# Patient Record
Sex: MALE | Race: WHITE | NOT HISPANIC OR LATINO | Employment: FULL TIME | URBAN - METROPOLITAN AREA
[De-identification: names, ages, dates, MRNs, and addresses within clinical notes are randomized per-mention and may not be internally consistent; named-entity substitution may affect disease eponyms.]

---

## 2018-10-15 ENCOUNTER — HISTORICAL (OUTPATIENT)
Dept: LAB | Facility: HOSPITAL | Age: 33
End: 2018-10-15

## 2018-10-15 LAB
ABS NEUT (OLG): 1.77 X10(3)/MCL (ref 2.1–9.2)
ALBUMIN SERPL-MCNC: 4.6 GM/DL (ref 3.4–5)
ALBUMIN/GLOB SERPL: 1.6 RATIO (ref 1.1–2)
ALP SERPL-CCNC: 57 UNIT/L (ref 46–116)
ALT SERPL-CCNC: 25 UNIT/L (ref 12–78)
AST SERPL-CCNC: 15 UNIT/L (ref 15–37)
BILIRUB SERPL-MCNC: 1.1 MG/DL (ref 0.2–1)
BILIRUBIN DIRECT+TOT PNL SERPL-MCNC: 0.21 MG/DL (ref 0–0.2)
BILIRUBIN DIRECT+TOT PNL SERPL-MCNC: 0.89 MG/DL (ref 0–0.8)
BUN SERPL-MCNC: 28.1 MG/DL (ref 7–18)
CALCIUM SERPL-MCNC: 8.9 MG/DL (ref 8.5–10.1)
CHLORIDE SERPL-SCNC: 102 MMOL/L (ref 98–107)
CO2 SERPL-SCNC: 30.2 MMOL/L (ref 21–32)
CREAT SERPL-MCNC: 1.18 MG/DL (ref 0.6–1.3)
DEPRECATED CALCIDIOL+CALCIFEROL SERPL-MC: 32.22 NG/ML (ref 30–80)
EOSINOPHIL # BLD AUTO: 0 X10(3)/MCL (ref 0–0.9)
EOSINOPHIL NFR BLD AUTO: 1 %
ERYTHROCYTE [DISTWIDTH] IN BLOOD BY AUTOMATED COUNT: 12.3 % (ref 11.5–17)
EST. AVERAGE GLUCOSE BLD GHB EST-MCNC: 100 MG/DL
GLOBULIN SER-MCNC: 2.9 GM/DL (ref 2.4–3.5)
GLUCOSE SERPL-MCNC: 91 MG/DL (ref 74–106)
HBA1C MFR BLD: 5.1 % (ref 4.5–6.2)
HCT VFR BLD AUTO: 44.8 % (ref 42–52)
HGB BLD-MCNC: 15 GM/DL (ref 14–18)
LYMPHOCYTES # BLD AUTO: 1.5 X10(3)/MCL (ref 0.6–4.6)
LYMPHOCYTES NFR BLD AUTO: 43 %
MCH RBC QN AUTO: 28.3 PG (ref 27–31)
MCHC RBC AUTO-ENTMCNC: 33.5 GM/DL (ref 33–36)
MCV RBC AUTO: 84.5 FL (ref 80–94)
MONOCYTES # BLD AUTO: 0.2 X10(3)/MCL (ref 0.1–1.3)
MONOCYTES NFR BLD AUTO: 5 %
NEUTROPHILS # BLD AUTO: 1.77 X10(3)/MCL (ref 1.4–7.9)
NEUTROPHILS NFR BLD AUTO: 51 %
PLATELET # BLD AUTO: 191 X10(3)/MCL (ref 130–400)
PMV BLD AUTO: 9.5 FL (ref 9.4–12.4)
POTASSIUM SERPL-SCNC: 4.4 MMOL/L (ref 3.5–5.1)
PROT SERPL-MCNC: 7.5 GM/DL (ref 6.4–8.2)
RBC # BLD AUTO: 5.3 X10(6)/MCL (ref 4.7–6.1)
SODIUM SERPL-SCNC: 140 MMOL/L (ref 136–145)
TESTOST SERPL-MCNC: 543 NG/DL (ref 241–827)
TSH SERPL-ACNC: 1.74 MIU/ML (ref 0.36–3.74)
WBC # SPEC AUTO: 3.5 X10(3)/MCL (ref 4.5–11.5)

## 2019-10-22 ENCOUNTER — HISTORICAL (OUTPATIENT)
Dept: LAB | Facility: HOSPITAL | Age: 34
End: 2019-10-22

## 2019-10-22 LAB
ABS NEUT (OLG): 1.78 X10(3)/MCL (ref 2.1–9.2)
ALBUMIN SERPL-MCNC: 4.8 GM/DL (ref 3.4–5)
ALBUMIN/GLOB SERPL: 1.4 RATIO (ref 1.1–2)
ALP SERPL-CCNC: 67 UNIT/L (ref 50–136)
ALT SERPL-CCNC: 26 UNIT/L (ref 12–78)
APPEARANCE, UA: CLEAR
AST SERPL-CCNC: 16 UNIT/L (ref 15–37)
BACTERIA SPEC CULT: NORMAL /HPF
BASOPHILS # BLD AUTO: 0 X10(3)/MCL (ref 0–0.2)
BASOPHILS NFR BLD AUTO: 0 %
BILIRUB SERPL-MCNC: 0.7 MG/DL (ref 0.2–1)
BILIRUB UR QL STRIP: NEGATIVE
BILIRUBIN DIRECT+TOT PNL SERPL-MCNC: 0.1 MG/DL (ref 0–0.5)
BILIRUBIN DIRECT+TOT PNL SERPL-MCNC: 0.6 MG/DL (ref 0–0.8)
BUN SERPL-MCNC: 28 MG/DL (ref 7–18)
CALCIUM SERPL-MCNC: 9 MG/DL (ref 8.5–10.1)
CHLORIDE SERPL-SCNC: 105 MMOL/L (ref 98–107)
CHOLEST SERPL-MCNC: 286 MG/DL (ref 0–200)
CHOLEST/HDLC SERPL: 4.8 {RATIO} (ref 0–5)
CO2 SERPL-SCNC: 28 MMOL/L (ref 21–32)
COLOR UR: YELLOW
CREAT SERPL-MCNC: 1.34 MG/DL (ref 0.7–1.3)
EOSINOPHIL # BLD AUTO: 0 X10(3)/MCL (ref 0–0.9)
EOSINOPHIL NFR BLD AUTO: 1 %
ERYTHROCYTE [DISTWIDTH] IN BLOOD BY AUTOMATED COUNT: 12.2 % (ref 11.5–17)
EST. AVERAGE GLUCOSE BLD GHB EST-MCNC: 97 MG/DL
GLOBULIN SER-MCNC: 3.4 GM/DL (ref 2.4–3.5)
GLUCOSE (UA): NEGATIVE
GLUCOSE SERPL-MCNC: 91 MG/DL (ref 74–106)
HBA1C MFR BLD: 5 % (ref 4.2–6.3)
HCT VFR BLD AUTO: 47.9 % (ref 42–52)
HDLC SERPL-MCNC: 60 MG/DL (ref 35–60)
HGB BLD-MCNC: 15.8 GM/DL (ref 14–18)
HGB UR QL STRIP: NEGATIVE
KETONES UR QL STRIP: NEGATIVE
LDLC SERPL CALC-MCNC: 206 MG/DL (ref 0–129)
LEUKOCYTE ESTERASE UR QL STRIP: NEGATIVE
LYMPHOCYTES # BLD AUTO: 1.4 X10(3)/MCL (ref 0.6–4.6)
LYMPHOCYTES NFR BLD AUTO: 40 %
MCH RBC QN AUTO: 27.6 PG (ref 27–31)
MCHC RBC AUTO-ENTMCNC: 33 GM/DL (ref 33–36)
MCV RBC AUTO: 83.7 FL (ref 80–94)
MONOCYTES # BLD AUTO: 0.2 X10(3)/MCL (ref 0.1–1.3)
MONOCYTES NFR BLD AUTO: 6 %
NEUTROPHILS # BLD AUTO: 1.78 X10(3)/MCL (ref 2.1–9.2)
NEUTROPHILS NFR BLD AUTO: 52 %
NITRITE UR QL STRIP: NEGATIVE
PH UR STRIP: 7 [PH] (ref 5–9)
PLATELET # BLD AUTO: 204 X10(3)/MCL (ref 130–400)
PMV BLD AUTO: 10.6 FL (ref 9.4–12.4)
POTASSIUM SERPL-SCNC: 4.5 MMOL/L (ref 3.5–5.1)
PROT SERPL-MCNC: 8.2 GM/DL (ref 6.4–8.2)
PROT UR QL STRIP: NEGATIVE
RBC # BLD AUTO: 5.72 X10(6)/MCL (ref 4.7–6.1)
RBC #/AREA URNS HPF: NORMAL /[HPF]
SODIUM SERPL-SCNC: 139 MMOL/L (ref 136–145)
SP GR UR STRIP: 1.01 (ref 1–1.03)
SQUAMOUS EPITHELIAL, UA: NORMAL
TRIGL SERPL-MCNC: 102 MG/DL (ref 30–150)
TSH SERPL-ACNC: 1.87 MIU/L (ref 0.36–3.74)
UROBILINOGEN UR STRIP-ACNC: 0.2
VLDLC SERPL CALC-MCNC: 20 MG/DL
WBC # SPEC AUTO: 3.4 X10(3)/MCL (ref 4.5–11.5)
WBC #/AREA URNS HPF: NORMAL /HPF

## 2019-12-04 ENCOUNTER — HISTORICAL (OUTPATIENT)
Dept: RADIOLOGY | Facility: HOSPITAL | Age: 34
End: 2019-12-04

## 2022-04-10 ENCOUNTER — HISTORICAL (OUTPATIENT)
Dept: ADMINISTRATIVE | Facility: HOSPITAL | Age: 37
End: 2022-04-10

## 2022-04-26 VITALS
BODY MASS INDEX: 28.31 KG/M2 | SYSTOLIC BLOOD PRESSURE: 124 MMHG | DIASTOLIC BLOOD PRESSURE: 84 MMHG | OXYGEN SATURATION: 99 % | HEIGHT: 69 IN | WEIGHT: 191.13 LBS

## 2022-09-12 ENCOUNTER — TELEPHONE (OUTPATIENT)
Dept: ADMINISTRATIVE | Facility: HOSPITAL | Age: 37
End: 2022-09-12
Payer: OTHER GOVERNMENT

## 2022-09-12 NOTE — TELEPHONE ENCOUNTER
----- Message from Tommy Peck MA sent at 9/12/2022 12:50 PM CDT -----  Regarding: FW: Return Call    ----- Message -----  From: Suzanna Marie  Sent: 9/12/2022  12:40 PM CDT  To: Reji Chavarria Staff  Subject: Return Call                                      Type:  Patient Returning Call    Who Called: pt  Who Left Message for Patient: for nurse  Does the patient know what this is regarding?: yes  Would the patient rather a call back or a response via [facility]?  Call back  Best Call Back Number: [phone]  Additional Information: pt called to schedule a hospital f/u .. he wants something sooner due to having some type of nerve damage .. he had a  injury

## 2022-09-15 ENCOUNTER — OFFICE VISIT (OUTPATIENT)
Dept: INTERNAL MEDICINE | Facility: CLINIC | Age: 37
End: 2022-09-15
Payer: OTHER GOVERNMENT

## 2022-09-15 VITALS
OXYGEN SATURATION: 99 % | DIASTOLIC BLOOD PRESSURE: 80 MMHG | BODY MASS INDEX: 29.49 KG/M2 | TEMPERATURE: 98 F | WEIGHT: 206 LBS | SYSTOLIC BLOOD PRESSURE: 128 MMHG | RESPIRATION RATE: 16 BRPM | HEART RATE: 78 BPM | HEIGHT: 70 IN

## 2022-09-15 DIAGNOSIS — M54.9 DORSALGIA, UNSPECIFIED: Primary | ICD-10-CM

## 2022-09-15 DIAGNOSIS — M54.16 LUMBAR RADICULOPATHY: ICD-10-CM

## 2022-09-15 PROCEDURE — 99214 PR OFFICE/OUTPT VISIT, EST, LEVL IV, 30-39 MIN: ICD-10-PCS | Mod: ,,, | Performed by: INTERNAL MEDICINE

## 2022-09-15 PROCEDURE — 99214 OFFICE O/P EST MOD 30 MIN: CPT | Mod: ,,, | Performed by: INTERNAL MEDICINE

## 2022-09-15 RX ORDER — DICLOFENAC SODIUM 75 MG/1
75 TABLET, DELAYED RELEASE ORAL 2 TIMES DAILY
COMMUNITY
Start: 2022-09-10 | End: 2022-10-10

## 2022-09-15 RX ORDER — METHYLPREDNISOLONE 4 MG/1
1 TABLET ORAL
COMMUNITY
Start: 2022-09-10 | End: 2022-09-16

## 2022-09-15 NOTE — ASSESSMENT & PLAN NOTE
Patient with straight leg raise positive at 45° on the right.    MRI ordered.    Referral to PT ordered.

## 2022-09-15 NOTE — PROGRESS NOTES
Subjective:      Patient ID: Vasu Victor is a 37 y.o. male.    Chief Complaint: Follow-up (ED F/U for Back pain/injury)      HPI:  37 year old male who I have not seen since 2019 presents with lumbar back pain in the L4- L5 area with radicular symtpoms on the right  Was doing  drills and began having acute onset of numbess down the right leg. [place] [facility] [place] with no acute findings. Hard time driving.     Past Medical History:  History reviewed. No pertinent past medical history.  Past Surgical History:   Procedure Laterality Date    VASECTOMY  6/1/19     Review of patient's allergies indicates:  No Known Allergies  Current Outpatient Medications on File Prior to Visit   Medication Sig Dispense Refill    diclofenac (VOLTAREN) 75 MG EC tablet Take 75 mg by mouth 2 (two) times a day.      methylPREDNISolone (MEDROL DOSEPACK) 4 mg tablet Take 1 tablet by mouth.       No current facility-administered medications on file prior to visit.     Social History     Socioeconomic History    Marital status:    Tobacco Use    Smoking status: Never    Smokeless tobacco: Never   Substance and Sexual Activity    Alcohol use: Yes     Alcohol/week: 7.0 standard drinks     Types: 7 Glasses of wine per week    Drug use: Never    Sexual activity: Yes     Partners: Female     Birth control/protection: Partner-Vasectomy     Family History   Problem Relation Age of Onset    Diabetes Mother     Heart disease Father        Review of Systems  Constitutional: No fever, no chills, no night sweats, no changes in weight, no changes in appetite.  Eye: No blurring of vision, no double vision, no conjunctival injection, no acute vision loss  ENMT: No trauma, No sore throat, no rhinorrhea, no tinnitus, no headache, no vertigo, no ear pain or discharge  Respiratory: No cough, no sputum production, no shortness of breath, no hemoptysis, no wheezing.  Cardiovascular: No chest pain, no PALM, no PND, no orthopnea, no edema, no  "palpitations.  Gastrointestinal: No abdominal pain, nausea, no vomiting, no changes in frequency or consistency of stools, no diarrhea, no constipation, no BRBPR.  Genitourinary: No dysuria, no hematuria, no foul-smelling urine, no straining to urinate, no increase in frequency  Heme/Lymph: No easy bruising/ bleeding, no swollen or painful glands.  Endocrine: No polyuria, no polydipsia, no polyphagia, no heat or cold intolerance.  Musculoskeletal: No muscle pain, no muscle weakness, no joint pain, + difficulties on reference to range of motion.  Integumentary: No rash, no pruritis.  Neurologic: No sensory deficit, no motor deficit, no headache, no neck rigidity, + paresthesias, no syncope.  Psychiatric: no mood changes, no anxiety, no depression, no tension, no memory defecits  All Other ROS: Negative with exception of what is documented in the history of present illness   A comprehensive review of systems was performed and was negative with exception of what is documented above.     Objective:   /80 (BP Location: Left arm, Patient Position: Sitting, BP Method: Medium (Manual))   Pulse 78   Temp 98 °F (36.7 °C) (Temporal)   Resp 16   Ht 5' 10" (1.778 m)   Wt 93.4 kg (206 lb)   SpO2 99%   BMI 29.56 kg/m²   Physical Exam  General : Alert and oriented, No acute distress, afebrile.  Eye : PERRLA. EOMI.   Musculoskeletal : Normal range of motion throughout. No muscle tenderness.  Integumentary : Warm, moist, intact.  Neurologic : Alert, Oriented,  No focal neurologic deficits. No sensory deficit. No motor deficit. +straight leg raise on the right at 45 degrees  Psychiatric : Cooperative, Appropriate mood & affect.   Assessment/ Plan:   1. Dorsalgia, unspecified  -     MRI Lumbar Spine Without Contrast  -     Ambulatory referral/consult to Physical/Occupational Therapy    2. Lumbar radiculopathy  Assessment & Plan:    Patient with straight leg raise positive at 45° on the right.    MRI ordered.    Referral to " PT ordered.    Orders:  -     Ambulatory referral/consult to Physical/Occupational Therapy           Follow up if symptoms worsen or fail to improve.

## 2022-09-16 ENCOUNTER — HOSPITAL ENCOUNTER (OUTPATIENT)
Dept: RADIOLOGY | Facility: HOSPITAL | Age: 37
Discharge: HOME | End: 2022-09-16
Attending: INTERNAL MEDICINE
Payer: OTHER GOVERNMENT

## 2022-09-16 DIAGNOSIS — M54.9 DORSALGIA, UNSPECIFIED: ICD-10-CM

## 2022-09-16 PROCEDURE — 72148 MRI LUMBAR SPINE W/O DYE: CPT | Mod: TC

## 2022-09-19 NOTE — PROGRESS NOTES
MRI reviewed, there is a disc that has moved out of place at L4-L5; it is minimal  This is amenable to physical therapy; referral was sent to the time of his visit  Lets see how he does at therapy

## 2022-12-12 ENCOUNTER — TELEPHONE (OUTPATIENT)
Dept: INTERNAL MEDICINE | Facility: CLINIC | Age: 37
End: 2022-12-12
Payer: OTHER GOVERNMENT

## 2022-12-12 NOTE — TELEPHONE ENCOUNTER
----- Message from Nick Hernandez sent at 12/12/2022  1:55 PM CST -----  Regarding: Lab Work  Caller is requesting blood work      Name of Caller: Patient     Date of EPP Appointment: 12/19    Where would they like the lab performed? [facility]    Would the patient rather a call back or a response via [facility]?  Call back when labs are in system    Best Call Back Number:[phone]    Additional Information: patient requesting for a CMP and for his Testerone levels to be checked as well

## 2022-12-19 ENCOUNTER — OFFICE VISIT (OUTPATIENT)
Dept: INTERNAL MEDICINE | Facility: CLINIC | Age: 37
End: 2022-12-19
Payer: OTHER GOVERNMENT

## 2022-12-19 VITALS
WEIGHT: 208 LBS | HEART RATE: 75 BPM | SYSTOLIC BLOOD PRESSURE: 136 MMHG | OXYGEN SATURATION: 98 % | DIASTOLIC BLOOD PRESSURE: 82 MMHG | RESPIRATION RATE: 16 BRPM | TEMPERATURE: 97 F | BODY MASS INDEX: 29.78 KG/M2 | HEIGHT: 70 IN

## 2022-12-19 DIAGNOSIS — M25.561 ACUTE PAIN OF BOTH KNEES: Primary | ICD-10-CM

## 2022-12-19 DIAGNOSIS — M25.562 ACUTE PAIN OF BOTH KNEES: Primary | ICD-10-CM

## 2022-12-19 DIAGNOSIS — Z00.00 WELL ADULT EXAM: ICD-10-CM

## 2022-12-19 PROCEDURE — 99213 PR OFFICE/OUTPT VISIT, EST, LEVL III, 20-29 MIN: ICD-10-PCS | Mod: 25,,, | Performed by: INTERNAL MEDICINE

## 2022-12-19 PROCEDURE — 20610 DRAIN/INJ JOINT/BURSA W/O US: CPT | Mod: 50,,, | Performed by: INTERNAL MEDICINE

## 2022-12-19 PROCEDURE — 20610 PR DRAIN/INJECT LARGE JOINT/BURSA: ICD-10-PCS | Mod: 50,,, | Performed by: INTERNAL MEDICINE

## 2022-12-19 PROCEDURE — 99213 OFFICE O/P EST LOW 20 MIN: CPT | Mod: 25,,, | Performed by: INTERNAL MEDICINE

## 2022-12-19 RX ORDER — TRIAMCINOLONE ACETONIDE 40 MG/ML
80 INJECTION, SUSPENSION INTRA-ARTICULAR; INTRAMUSCULAR
Status: COMPLETED | OUTPATIENT
Start: 2022-12-19 | End: 2022-12-19

## 2022-12-19 RX ORDER — LIDOCAINE HYDROCHLORIDE 10 MG/ML
4 INJECTION INFILTRATION; PERINEURAL
Status: COMPLETED | OUTPATIENT
Start: 2022-12-19 | End: 2022-12-19

## 2022-12-19 RX ORDER — DOXYCYCLINE 50 MG/1
50 CAPSULE ORAL
COMMUNITY
End: 2023-02-02

## 2022-12-19 RX ADMIN — LIDOCAINE HYDROCHLORIDE 4 ML: 10 INJECTION INFILTRATION; PERINEURAL at 03:12

## 2022-12-19 RX ADMIN — TRIAMCINOLONE ACETONIDE 80 MG: 40 INJECTION, SUSPENSION INTRA-ARTICULAR; INTRAMUSCULAR at 03:12

## 2022-12-19 NOTE — PROGRESS NOTES
Subjective:      Patient ID: Vasu Victor is a 37 y.o. male.    Chief Complaint: Knee Pain (Bilateral knee pain. Pt complains that Lt knee has been hurting for the past 2 years only when he uses the stairs, and the Rt knee has been throbbing consistently for the past 3-4 weeks ) and Back Pain (Pt stated that he finished PTT for back and it does not feel better, probably feels a little worse. )      HPI:    37-year-old  male  He has a past medical history of PTSD.    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 Needs coronary calcium score  White blood cell count with some mild leukopenia; peripheral smear is normal.       Today here is here for a problem visit as it relates to his knees  Left knee pain; worse with climbing stairs  Sharp pain  Pain alleviated with cessation of activity    Right knee pain for the past 3 weeks  No injury; pain is inside the right knee  Its constant, throbbing    Past Medical History:  History reviewed. No pertinent past medical history.  Past Surgical History:   Procedure Laterality Date    VASECTOMY  6/1/19     Review of patient's allergies indicates:  No Known Allergies  Current Outpatient Medications on File Prior to Visit   Medication Sig Dispense Refill    doxycycline (MONODOX) 50 MG Cap Take 50 mg by mouth every 12 (twelve) hours.       No current facility-administered medications on file prior to visit.     Social History     Socioeconomic History    Marital status:    Tobacco Use    Smoking status: Never    Smokeless tobacco: Never   Substance and Sexual Activity    Alcohol use: Yes     Alcohol/week: 7.0 standard drinks     Types: 7 Glasses of wine per week    Drug use: Never    Sexual  "activity: Yes     Partners: Female     Birth control/protection: Partner-Vasectomy     Family History   Problem Relation Age of Onset    Diabetes Mother     Heart disease Father        Review of Systems  A comprehensive review of systems was performed and was negative with exception of what is documented above.     Objective:   /82 (BP Location: Left arm, Patient Position: Sitting, BP Method: Medium (Manual))   Pulse 75   Temp 97.3 °F (36.3 °C) (Temporal)   Resp 16   Ht 5' 10" (1.778 m)   Wt 94.3 kg (208 lb)   SpO2 98%   BMI 29.84 kg/m²   Physical Exam  General : Alert and oriented, No acute distress, afebrile.  Eye : PERRLA. EOMI. Normal conjunctiva, Sclerae are nonicteric.  Musculoskeletal : Normal range of motion throughout. No muscle tenderness.  Integumentary : Warm, moist, intact.  Neurologic : Alert, Oriented.  Psychiatric : Cooperative, Appropriate mood & affect.   Assessment/ Plan:   1. Acute pain of both knees  Assessment & Plan:  Procedure note: Informed consent signed by patient and provider.  Point of needle insertion was marked in identify with surgical marker, cleaned with 3 Betadine swabs.  40 mg of Kenalog and 2 cc of lidocaine were injected into the intra-articular space of both knees using a lateral approach.  Joint injection after care information given, no adverse effect.    She is RTC 6 weeks for wellness, lab orders placed      2. Well adult exam  -     CBC Auto Differential; Future; Expected date: 12/19/2022  -     Comprehensive Metabolic Panel; Future; Expected date: 12/19/2022  -     Lipid Panel; Future; Expected date: 12/19/2022  -     TSH; Future; Expected date: 12/19/2022  -     Hemoglobin A1C; Future; Expected date: 12/19/2022  -     Urinalysis; Future; Expected date: 12/19/2022    Other orders  -     LIDOcaine HCL 10 mg/ml (1%) injection 4 mL  -     triamcinolone acetonide injection 80 mg             Follow up in about 6 weeks (around 1/30/2023) for WELLNESS, NURSE " PRACTITIONER, with labs prior to visit.

## 2023-01-09 ENCOUNTER — TELEPHONE (OUTPATIENT)
Dept: INTERNAL MEDICINE | Facility: CLINIC | Age: 38
End: 2023-01-09
Payer: OTHER GOVERNMENT

## 2023-01-09 DIAGNOSIS — M25.562 ACUTE PAIN OF BOTH KNEES: Primary | ICD-10-CM

## 2023-01-09 DIAGNOSIS — M25.561 ACUTE PAIN OF BOTH KNEES: Primary | ICD-10-CM

## 2023-01-09 NOTE — TELEPHONE ENCOUNTER
----- Message from Mary Harman sent at 1/9/2023  2:11 PM CST -----  Regarding: referral  Type:  Patient Requesting Referral    Who Called:pt  Does the patient already have the specialty appointment scheduled?:  If yes, what is the date of that appointment?:  Referral to What Specialty:pt or orthopedist  Reason for Referral:knee pain  Does the patient want the referral with a specific physician?:  Is the specialist an [facility]?:  Patient Requesting a Response?:c/b  Would the patient rather a call back or a response via [facility]? C/b  Best Call Back Number:[phone]  Additional Information: pt has received injections in knees and it has not helped at all.  Pt is requesting next step either PT or referral to orthopedist

## 2023-01-10 ENCOUNTER — TELEPHONE (OUTPATIENT)
Dept: INTERNAL MEDICINE | Facility: CLINIC | Age: 38
End: 2023-01-10
Payer: OTHER GOVERNMENT

## 2023-01-10 NOTE — TELEPHONE ENCOUNTER
----- Message from Mary Harman sent at 1/10/2023 10:54 AM CST -----  Regarding: referral  Type:  Patient Requesting Referral    Who Called:pt  Does the patient already have the specialty appointment scheduled?:  If yes, what is the date of that appointment?:  Referral to What Specialty:orthopedist  Reason for Referral:  Does the patient want the referral with a specific physician?:Dr Candelario Vinson or Dr Cl Jewell  Is the specialist an [facility]?:  Patient Requesting a Response?:yes  Would the patient rather a call back or a response via [facility]? C/b  Best Call Back Number:[phone]  Additional Information: pt would like referral for otho to go to either one of the orthopedists listed above  Thank you

## 2023-01-24 ENCOUNTER — LAB VISIT (OUTPATIENT)
Dept: LAB | Facility: HOSPITAL | Age: 38
End: 2023-01-24
Attending: INTERNAL MEDICINE
Payer: OTHER GOVERNMENT

## 2023-01-24 DIAGNOSIS — Z00.00 WELL ADULT EXAM: ICD-10-CM

## 2023-01-24 LAB
ALBUMIN SERPL-MCNC: 4.8 G/DL (ref 3.5–5)
ALBUMIN/GLOB SERPL: 1.6 RATIO (ref 1.1–2)
ALP SERPL-CCNC: 50 UNIT/L (ref 40–150)
ALT SERPL-CCNC: 23 UNIT/L (ref 0–55)
APPEARANCE UR: CLEAR
AST SERPL-CCNC: 17 UNIT/L (ref 5–34)
BACTERIA #/AREA URNS AUTO: NORMAL /HPF
BASOPHILS # BLD AUTO: 0.01 X10(3)/MCL (ref 0–0.2)
BASOPHILS NFR BLD AUTO: 0.3 %
BILIRUB UR QL STRIP.AUTO: NEGATIVE MG/DL
BILIRUBIN DIRECT+TOT PNL SERPL-MCNC: 0.9 MG/DL
BUN SERPL-MCNC: 18.5 MG/DL (ref 8.9–20.6)
CALCIUM SERPL-MCNC: 9.9 MG/DL (ref 8.4–10.2)
CHLORIDE SERPL-SCNC: 102 MMOL/L (ref 98–107)
CHOLEST SERPL-MCNC: 264 MG/DL
CHOLEST/HDLC SERPL: 4 {RATIO} (ref 0–5)
CO2 SERPL-SCNC: 28 MMOL/L (ref 22–29)
COLOR UR AUTO: YELLOW
CREAT SERPL-MCNC: 1.17 MG/DL (ref 0.73–1.18)
EOSINOPHIL # BLD AUTO: 0.02 X10(3)/MCL (ref 0–0.9)
EOSINOPHIL NFR BLD AUTO: 0.6 %
ERYTHROCYTE [DISTWIDTH] IN BLOOD BY AUTOMATED COUNT: 12.1 % (ref 11.5–17)
EST. AVERAGE GLUCOSE BLD GHB EST-MCNC: 102.5 MG/DL
GFR SERPLBLD CREATININE-BSD FMLA CKD-EPI: >60 MLS/MIN/1.73/M2
GLOBULIN SER-MCNC: 3 GM/DL (ref 2.4–3.5)
GLUCOSE SERPL-MCNC: 95 MG/DL (ref 74–100)
GLUCOSE UR QL STRIP.AUTO: NEGATIVE MG/DL
HBA1C MFR BLD: 5.2 %
HCT VFR BLD AUTO: 49 % (ref 42–52)
HDLC SERPL-MCNC: 61 MG/DL (ref 35–60)
HGB BLD-MCNC: 15.8 GM/DL (ref 14–18)
IMM GRANULOCYTES # BLD AUTO: 0 X10(3)/MCL (ref 0–0.04)
IMM GRANULOCYTES NFR BLD AUTO: 0 %
KETONES UR QL STRIP.AUTO: NEGATIVE MG/DL
LDLC SERPL CALC-MCNC: 186 MG/DL (ref 50–140)
LEUKOCYTE ESTERASE UR QL STRIP.AUTO: NEGATIVE UNIT/L
LYMPHOCYTES # BLD AUTO: 1.65 X10(3)/MCL (ref 0.6–4.6)
LYMPHOCYTES NFR BLD AUTO: 47.7 %
MCH RBC QN AUTO: 27.4 PG
MCHC RBC AUTO-ENTMCNC: 32.2 MG/DL (ref 33–36)
MCV RBC AUTO: 85.1 FL (ref 80–94)
MONOCYTES # BLD AUTO: 0.23 X10(3)/MCL (ref 0.1–1.3)
MONOCYTES NFR BLD AUTO: 6.6 %
NEUTROPHILS # BLD AUTO: 1.55 X10(3)/MCL (ref 2.1–9.2)
NEUTROPHILS NFR BLD AUTO: 44.8 %
NITRITE UR QL STRIP.AUTO: NEGATIVE
PH UR STRIP.AUTO: 7 [PH]
PLATELET # BLD AUTO: 230 X10(3)/MCL (ref 130–400)
PMV BLD AUTO: 9.9 FL (ref 7.4–10.4)
POTASSIUM SERPL-SCNC: 4.4 MMOL/L (ref 3.5–5.1)
PROT SERPL-MCNC: 7.8 GM/DL (ref 6.4–8.3)
PROT UR QL STRIP.AUTO: NEGATIVE MG/DL
RBC # BLD AUTO: 5.76 X10(6)/MCL (ref 4.7–6.1)
RBC #/AREA URNS AUTO: NORMAL /HPF
RBC UR QL AUTO: NEGATIVE UNIT/L
SODIUM SERPL-SCNC: 139 MMOL/L (ref 136–145)
SP GR UR STRIP.AUTO: 1.02
SQUAMOUS #/AREA URNS AUTO: NORMAL /HPF
TRIGL SERPL-MCNC: 85 MG/DL (ref 34–140)
TSH SERPL-ACNC: 1.81 UIU/ML (ref 0.35–4.94)
UROBILINOGEN UR STRIP-ACNC: 0.2 MG/DL
VLDLC SERPL CALC-MCNC: 17 MG/DL
WBC # SPEC AUTO: 3.5 X10(3)/MCL (ref 4.5–11.5)
WBC #/AREA URNS AUTO: NORMAL /HPF

## 2023-01-24 PROCEDURE — 80053 COMPREHEN METABOLIC PANEL: CPT

## 2023-01-24 PROCEDURE — 83036 HEMOGLOBIN GLYCOSYLATED A1C: CPT

## 2023-01-24 PROCEDURE — 36415 COLL VENOUS BLD VENIPUNCTURE: CPT

## 2023-01-24 PROCEDURE — 80061 LIPID PANEL: CPT

## 2023-01-24 PROCEDURE — 84443 ASSAY THYROID STIM HORMONE: CPT

## 2023-01-24 PROCEDURE — 85025 COMPLETE CBC W/AUTO DIFF WBC: CPT

## 2023-01-25 ENCOUNTER — OFFICE VISIT (OUTPATIENT)
Dept: ORTHOPEDICS | Facility: CLINIC | Age: 38
End: 2023-01-25
Payer: OTHER GOVERNMENT

## 2023-01-25 ENCOUNTER — TELEPHONE (OUTPATIENT)
Dept: INTERNAL MEDICINE | Facility: CLINIC | Age: 38
End: 2023-01-25
Payer: OTHER GOVERNMENT

## 2023-01-25 ENCOUNTER — HOSPITAL ENCOUNTER (OUTPATIENT)
Dept: RADIOLOGY | Facility: CLINIC | Age: 38
Discharge: HOME | End: 2023-01-25
Attending: ORTHOPAEDIC SURGERY
Payer: OTHER GOVERNMENT

## 2023-01-25 ENCOUNTER — PATIENT MESSAGE (OUTPATIENT)
Dept: INTERNAL MEDICINE | Facility: CLINIC | Age: 38
End: 2023-01-25
Payer: OTHER GOVERNMENT

## 2023-01-25 DIAGNOSIS — R53.83 FATIGUE, UNSPECIFIED TYPE: Primary | ICD-10-CM

## 2023-01-25 DIAGNOSIS — M25.562 ACUTE PAIN OF BOTH KNEES: ICD-10-CM

## 2023-01-25 DIAGNOSIS — M25.561 ACUTE PAIN OF BOTH KNEES: ICD-10-CM

## 2023-01-25 DIAGNOSIS — M76.52 PATELLAR TENDINITIS OF LEFT KNEE: ICD-10-CM

## 2023-01-25 DIAGNOSIS — E78.5 HYPERLIPIDEMIA, UNSPECIFIED HYPERLIPIDEMIA TYPE: Primary | ICD-10-CM

## 2023-01-25 DIAGNOSIS — S83.241A ACUTE MEDIAL MENISCUS TEAR OF RIGHT KNEE, INITIAL ENCOUNTER: Primary | ICD-10-CM

## 2023-01-25 PROCEDURE — 73564 X-RAY EXAM KNEE 4 OR MORE: CPT | Mod: 50,,, | Performed by: ORTHOPAEDIC SURGERY

## 2023-01-25 PROCEDURE — 99203 PR OFFICE/OUTPT VISIT, NEW, LEVL III, 30-44 MIN: ICD-10-PCS | Mod: ,,, | Performed by: ORTHOPAEDIC SURGERY

## 2023-01-25 PROCEDURE — 73564 XR KNEE COMP 4 OR MORE VIEWS BILAT: ICD-10-PCS | Mod: 50,,, | Performed by: ORTHOPAEDIC SURGERY

## 2023-01-25 PROCEDURE — 99203 OFFICE O/P NEW LOW 30 MIN: CPT | Mod: ,,, | Performed by: ORTHOPAEDIC SURGERY

## 2023-01-25 RX ORDER — ROSUVASTATIN CALCIUM 10 MG/1
10 TABLET, COATED ORAL DAILY
Qty: 90 TABLET | Refills: 3 | Status: SHIPPED | OUTPATIENT
Start: 2023-01-25 | End: 2023-02-02

## 2023-01-25 NOTE — PROGRESS NOTES
Chief Complaint:   Chief Complaint   Patient presents with    Right Knee - Pain    Left Knee - Pain    Knee Pain      right knee pain for 3 months, left knee pain for 1yr , no injury or surgery right knee worse than the left, bilateral knee cortisone injection 6 weeks ago from PCP,  no P.T., no brace, Unable to obtain vitals.        Consulting Physician: Deon Lopez*    History of present illness:    he is a pleasant 37 y.o. year old male who has had increasing right knee pain over the last 3 months.  The pain is located on the medial side of the knee.  He knows it worse with activity.  It is somewhat better with rest.  He has tried a home exercise program since October of 2022.  He had a steroid injection in December of 2022 with transient relief.  He denies any numbness or tingling.    He has noticed a longstanding history of right knee pain when he caries weight is down stairs.    Past Medical History:   Diagnosis Date    High cholesterol        Past Surgical History:   Procedure Laterality Date    VASECTOMY  6/1/19       Current Outpatient Medications   Medication Sig    doxycycline (MONODOX) 50 MG Cap Take 50 mg by mouth every 12 (twelve) hours.    rosuvastatin (CRESTOR) 10 MG tablet Take 1 tablet (10 mg total) by mouth once daily. (Patient not taking: Reported on 1/25/2023)     No current facility-administered medications for this visit.       Review of patient's allergies indicates:  No Known Allergies    Family History   Problem Relation Age of Onset    Diabetes Mother     Heart disease Father        Social History     Socioeconomic History    Marital status:    Tobacco Use    Smoking status: Never    Smokeless tobacco: Never   Substance and Sexual Activity    Alcohol use: Yes     Alcohol/week: 7.0 standard drinks     Types: 7 Glasses of wine per week    Drug use: Never    Sexual activity: Yes     Partners: Female     Birth control/protection: Partner-Vasectomy       Review of  Systems:    Constitution:   Denies chills, fever, and sweats.  HENT:   Denies headaches or blurry vision.  Cardiovascular:  Denies chest pain or irregular heart beat.  Respiratory:   Denies cough or shortness of breath.  Gastrointestinal:  Denies abdominal pain, nausea, or vomiting.  Musculoskeletal:   Denies muscle cramps.  Neurological:   Denies dizziness or focal weakness.  Psychiatric/Behavior: Normal mental status.  Hematology/Lymph:  Denies bleeding problem or easy bruising/bleeding.  Skin:    Denies rash or suspicious lesions.    Examination:    Vital Signs:  There were no vitals filed for this visit.    There is no height or weight on file to calculate BMI.    Constitution:   Well-developed, well nourished patient in no acute distress.  Neurological:   Alert and oriented x 3 and cooperative to examination.     Psychiatric/Behavior: Normal mental status.  Respiratory:   No shortness of breath.  Eyes:    Extraoccular muscles intact  Skin:    No scars, rash or suspicious lesions.    MSK:   Standing exam  stance: normal alignment, no significant leg-length discrepancy  gait:  Mildly antalgic    Knee examination  - General comments: unremarkable appearance    - Tenderness:  Right-sided medial joint line    Knee                  RIGHT    LEFT  Skin:                  Intact      Intact  ROM:                 0-130      0-130  Effusion:             +        Neg  MJL TTP:           +         Neg  LJL TTP:            Neg         Neg  Jose Elias:         +         Neg  Pat crep:            Neg         Neg  Patella TTPs:     Neg         Neg  Patella grind:      Neg        Neg  Lachman:           Neg        Neg  Pivot shift:          Neg        Neg  Valgus stress:    Neg        Neg  Varus stress:      Neg        Neg  Posterior drawer: Neg       Neg    N-V intact intact  Hip: nml nml    Lower extremity edema:Negative     Imaging: X-rays ordered and images interpreted today personally by me of four views of bilateral knees  show normal bony alignment.       Assessment: Acute medial meniscus tear of right knee, initial encounter  -     MRI Knee Without Contrast Right; Future; Expected date: 01/25/2023    Acute pain of both knees  -     Ambulatory referral/consult to Orthopedics  -     X-Ray Knee Complete 4 Or More Views Bilat; Future; Expected date: 01/25/2023    Patellar tendinitis of left knee        Plan:  MRI to evaluate his right medial meniscus.  I will see him back after for review

## 2023-01-25 NOTE — PROGRESS NOTES
CT calcium score of 0  Non HDL calculated to be at 203 which is higher than upper threshold limit of 190  Start statin  Crestor 10mg daily  Recheck lipids in 6 weeks  Remainder of labs are stable

## 2023-01-25 NOTE — TELEPHONE ENCOUNTER
----- Message from Deon Lopez MD sent at 1/25/2023  8:45 AM CST -----  CT calcium score of 0  Non HDL calculated to be at 203 which is higher than upper threshold limit of 190  Start statin  Crestor 10mg daily  Recheck lipids in 6 weeks  Remainder of labs are stable

## 2023-01-26 ENCOUNTER — LAB VISIT (OUTPATIENT)
Dept: LAB | Facility: HOSPITAL | Age: 38
End: 2023-01-26
Attending: INTERNAL MEDICINE
Payer: OTHER GOVERNMENT

## 2023-01-26 DIAGNOSIS — R53.83 FATIGUE, UNSPECIFIED TYPE: ICD-10-CM

## 2023-01-26 PROCEDURE — 84403 ASSAY OF TOTAL TESTOSTERONE: CPT

## 2023-01-26 PROCEDURE — 36415 COLL VENOUS BLD VENIPUNCTURE: CPT

## 2023-01-27 LAB — TESTOST SERPL-MCNC: 489.83 NG/DL (ref 240.24–870.68)

## 2023-01-30 DIAGNOSIS — S83.241A ACUTE MEDIAL MENISCUS TEAR OF RIGHT KNEE, INITIAL ENCOUNTER: Primary | ICD-10-CM

## 2023-02-02 ENCOUNTER — OFFICE VISIT (OUTPATIENT)
Dept: INTERNAL MEDICINE | Facility: CLINIC | Age: 38
End: 2023-02-02
Payer: OTHER GOVERNMENT

## 2023-02-02 ENCOUNTER — PATIENT MESSAGE (OUTPATIENT)
Dept: INTERNAL MEDICINE | Facility: CLINIC | Age: 38
End: 2023-02-02

## 2023-02-02 VITALS
WEIGHT: 201 LBS | HEART RATE: 65 BPM | DIASTOLIC BLOOD PRESSURE: 72 MMHG | OXYGEN SATURATION: 99 % | SYSTOLIC BLOOD PRESSURE: 105 MMHG | HEIGHT: 70 IN | RESPIRATION RATE: 16 BRPM | BODY MASS INDEX: 28.77 KG/M2 | TEMPERATURE: 98 F

## 2023-02-02 DIAGNOSIS — M54.16 LUMBAR RADICULOPATHY: ICD-10-CM

## 2023-02-02 DIAGNOSIS — Z00.00 WELL ADULT EXAM: Primary | ICD-10-CM

## 2023-02-02 DIAGNOSIS — R06.81 WITNESSED APNEIC SPELLS: ICD-10-CM

## 2023-02-02 DIAGNOSIS — F43.10 PTSD (POST-TRAUMATIC STRESS DISORDER): ICD-10-CM

## 2023-02-02 DIAGNOSIS — G47.19 EXCESSIVE DAYTIME SLEEPINESS: ICD-10-CM

## 2023-02-02 DIAGNOSIS — E78.00 HIGH CHOLESTEROL: ICD-10-CM

## 2023-02-02 PROCEDURE — 99214 PR OFFICE/OUTPT VISIT, EST, LEVL IV, 30-39 MIN: ICD-10-PCS | Mod: 25,,, | Performed by: NURSE PRACTITIONER

## 2023-02-02 PROCEDURE — 99395 PR PREVENTIVE VISIT,EST,18-39: ICD-10-PCS | Mod: ,,, | Performed by: NURSE PRACTITIONER

## 2023-02-02 PROCEDURE — 99214 OFFICE O/P EST MOD 30 MIN: CPT | Mod: 25,,, | Performed by: NURSE PRACTITIONER

## 2023-02-02 PROCEDURE — 99395 PREV VISIT EST AGE 18-39: CPT | Mod: ,,, | Performed by: NURSE PRACTITIONER

## 2023-02-02 NOTE — PROGRESS NOTES
Patient ID: [id number]     Chief Complaint: Annual Exam, Headache, Sinus Problem, Blurred Vision, and Low-back Pain        HPI:     Thomas Victor is a 37 y.o. male here today for an annual wellness. Reviewed and discussed lab results. LDL is very elevated but declines addition of statin at this time. Had normal CT Calcium score in 2019 but family history of premature CAD. He has multiple complaints today.  Seeing Dr. Vinson for chronic knee pain - plans to have MRI in the near future. [facility]. MRI shows disc bulge at L4-5. Completed PT with minimal relief. Also complaining of bilateral shoulder pain for several years. No inciting injury or trauma. Also complaining of EDS and witnessed apneic episodes at night. Would prefer home sleep study over sleep lab. No prior sleep study. Also reports migraine type headache that occurs about once weekly - lasts about 15-30 minutes per episode. Associated with visual aura - describes as bright light with blurred vision then resolves. May or may not be accompanied by frontal headache.  He also reports periods of SOB over the last six year or so that is associated with stressful situations. No exertional component, CP, dizziness, palpitations. Has a history of PTSD and plans to start seeing counselor in the near future. No other complaints today.     Past Medical History:  has a past medical history of High cholesterol, Lumbar radiculopathy, and PTSD (post-traumatic stress disorder).    Surgical History:  has a past surgical history that includes Vasectomy (6/1/19).    Family History: family history includes Diabetes in his mother; Heart disease in his father.    Social History:  reports that he has never smoked. He has never used smokeless tobacco. He reports current alcohol use of about 7.0 standard drinks per week. He reports that he does not use drugs.    Current Outpatient Medications   Medication Instructions     "doxycycline (MONODOX) 50 mg, Oral, Every 12 hours (non-standard times)    rosuvastatin (CRESTOR) 10 mg, Oral, Daily       Patient has No Known Allergies.     Patient Care Team:  Deon Lopez MD as PCP - General (Internal Medicine)  Amarjit Rodríguez MD (Dermatology)  Candelario Vinson Jr., MD as Consulting Physician (Orthopedic Surgery)     Subjective:     Review of Systems    12 point review of systems conducted, negative except as stated in the history of present illness. See HPI for details.      Objective:     Visit Vitals  /72   Pulse 65   Temp 98 °F (36.7 °C)   Resp 16   Ht 5' 10" (1.778 m)   Wt 91.2 kg (201 lb)   SpO2 99%   BMI 28.84 kg/m²       Physical Exam    General: Alert and oriented, No acute distress.  Head: Normocephalic, Atraumatic.  Eye: Pupils are equal, round and reactive to light, Extraocular movements are intact, Sclera non-icteric.  Ears/Nose/Throat: Normal, Mucosa moist,Clear.  Neck/Thyroid: Supple, Non-tender, No carotid bruit, No palpable thyromegaly or thyroid nodule, No lymphadenopathy, No JVD, Full range of motion.  Respiratory: Clear to auscultation bilaterally; No wheezes, rales or rhonchi,Non-labored respirations, Symmetrical chest wall expansion.  Cardiovascular: Regular rate and rhythm, S1/S2 normal, No murmurs, rubs or gallops.  Gastrointestinal: Soft, Non-tender, Non-distended, Normal bowel sounds, No palpable organomegaly.  Musculoskeletal: Normal range of motion.  Integumentary: Warm, Dry, Intact, No suspicious lesions or rashes.  Extremities: No clubbing, cyanosis or edema  Neurologic: No focal deficits, Cranial Nerves II-XII are grossly intact, Motor strength normal upper and lower extremities, Sensory exam intact.  Psychiatric: Normal interaction, Coherent speech, Euthymic mood, Appropriate affect       Labs Reviewed:     Chemistry:  Lab Results   Component Value Date     01/24/2023    K 4.4 01/24/2023    CHLORIDE 102 01/24/2023    BUN 18.5 01/24/2023    " CREATININE 1.17 01/24/2023    EGFRNORACEVR >60 01/24/2023    GLUCOSE 95 01/24/2023    CALCIUM 9.9 01/24/2023    ALKPHOS 50 01/24/2023    LABPROT 7.8 01/24/2023    ALBUMIN 4.8 01/24/2023    BILIDIR 0.10 10/22/2019    IBILI 0.60 10/22/2019    AST 17 01/24/2023    ALT 23 01/24/2023    HZZRZHBA39UU 32.22 10/15/2018        Lab Results   Component Value Date    HGBA1C 5.2 01/24/2023        Hematology:  Lab Results   Component Value Date    WBC 3.5 (L) 01/24/2023    HGB 15.8 01/24/2023    HCT 49.0 01/24/2023     01/24/2023       Lipid Panel:  Lab Results   Component Value Date    CHOL 264 (H) 01/24/2023    HDL 61 (H) 01/24/2023    .00 (H) 01/24/2023    TRIG 85 01/24/2023    TOTALCHOLEST 4 01/24/2023        Urine:  Lab Results   Component Value Date    COLORUA Yellow 01/24/2023    APPEARANCEUA Clear 01/24/2023    SGUA 1.020 01/24/2023    PHUA 7.0 01/24/2023    PROTEINUA Negative 01/24/2023    GLUCOSEUA Negative 01/24/2023    KETONESUA Negative 01/24/2023    BLOODUA Negative 01/24/2023    NITRITESUA Negative 01/24/2023    LEUKOCYTESUR Negative 01/24/2023    RBCUA None Seen 01/24/2023    WBCUA None Seen 01/24/2023    BACTERIA None Seen 01/24/2023          Assessment:       ICD-10-CM ICD-9-CM   1. Well adult exam  Z00.00 V70.0   2. High cholesterol  E78.00 272.0   3. Witnessed apneic spells  R06.81 786.03   4. Excessive daytime sleepiness  G47.19 780.54   5. PTSD (post-traumatic stress disorder)  F43.10 309.81   6. Lumbar radiculopathy  M54.16 724.4        Plan:     Prostate Cancer Screening - No family history. Will initiate at 50.     Colon Cancer Screening -  No family history. Will initiate at 45.     Eye Exam - Recommend annually.     Dental Exam - Recommend biannual exams.     Vaccinations -   Immunization History   Administered Date(s) Administered    COVID-19 Vaccine 05/12/2022, 06/14/2022    Meningococcal Conjugate (MCV4P) 02/16/2006    Tdap 10/22/2019          1. Well adult exam    2. High  cholesterol  Lab Results   Component Value Date    .00 (H) 01/24/2023    TRIG 85 01/24/2023    HDL 61 (H) 01/24/2023    TOTALCHOLEST 4 01/24/2023     Declines addition of statin at this time. Would like to intensify lifestyle modifications. Repeat labs in 3 months.  Stressed importance of dietary modifications. Follow a low cholesterol, low saturated fat diet with less that 200mg of cholesterol a day.  Avoid fried foods and high saturated fats (high saturated fats less than 7% of calories).  Add Flax Seed/Fish Oil supplements to diet. Increase dietary fiber.  Regular exercise can reduce LDL and raise HDL. Stressed importance of physical activity 5 times per week for 30 minutes per day.   - Comprehensive Metabolic Panel; Future  - Lipid Panel; Future    3. Witnessed apneic spells  Sleep study ordered.  - Ambulatory referral/consult to Sleep Disorders; Future    4. Excessive daytime sleepiness  Sleep study ordered.   - Ambulatory referral/consult to Sleep Disorders; Future    5. PTSD (post-traumatic stress disorder)  Proceed with counseling as scheduled.  Practice deep breathing or abdominal breathing exercises when anxiety occurs.  Exercise daily. Get sunlight daily.  Avoid caffeine, alcohol and stimulants.  Practice positive phrases and repeat throughout the day, along with yoga and relaxation techniques.  Set healthy boundaries, avoid people and conversations that increase stress.  Reports any symptoms of suicidal or homicidal ideations immediately, if clinic is closed go to nearest emergency room.    6. Lumbar radiculopathy  [facility]   MRI LS 9/2022   Lower back stretches daily.  Avoid strenuous lifting, use proper body mechanics.  Heating pad, Ice pack, Biofreeze or Epsom salt baths as needed.  Exercise to strengthen core muscles to support your back.  PT completed        The patient's Health Maintenance was reviewed and the following appears to be due at this time:   Health  Maintenance Due   Topic Date Due    Hepatitis C Screening  Never done    HIV Screening  Never done    COVID-19 Vaccine (3 - Booster) 08/09/2022    Influenza Vaccine (1) Never done       In addition to their scheduled follow up, the patient has also been instructed to follow up on as needed basis.     Future Appointments   Date Time Provider Department Center   4/27/2023  9:00 AM [facility] [id number]        MONA Traylor

## 2023-02-03 DIAGNOSIS — E78.00 HIGH CHOLESTEROL: Primary | ICD-10-CM

## 2023-02-23 DIAGNOSIS — M23.92 INTERNAL DERANGEMENT OF KNEE, ACUTE, LEFT: Primary | ICD-10-CM

## 2023-03-03 ENCOUNTER — HOSPITAL ENCOUNTER (OUTPATIENT)
Dept: RADIOLOGY | Facility: HOSPITAL | Age: 38
Discharge: HOME | End: 2023-03-03
Attending: ORTHOPAEDIC SURGERY
Payer: OTHER GOVERNMENT

## 2023-03-03 ENCOUNTER — OFFICE VISIT (OUTPATIENT)
Dept: NEUROLOGY | Facility: CLINIC | Age: 38
End: 2023-03-03
Payer: OTHER GOVERNMENT

## 2023-03-03 DIAGNOSIS — M23.92 INTERNAL DERANGEMENT OF KNEE, ACUTE, LEFT: ICD-10-CM

## 2023-03-03 DIAGNOSIS — F51.04 PSYCHOPHYSIOLOGIC INSOMNIA: ICD-10-CM

## 2023-03-03 DIAGNOSIS — G47.33 OBSTRUCTIVE SLEEP APNEA: Primary | ICD-10-CM

## 2023-03-03 DIAGNOSIS — S83.241A ACUTE MEDIAL MENISCUS TEAR OF RIGHT KNEE, INITIAL ENCOUNTER: ICD-10-CM

## 2023-03-03 PROCEDURE — 73721 MRI JNT OF LWR EXTRE W/O DYE: CPT | Mod: TC,LT

## 2023-03-03 PROCEDURE — 73721 MRI JNT OF LWR EXTRE W/O DYE: CPT | Mod: TC,RT

## 2023-03-03 PROCEDURE — 99204 OFFICE O/P NEW MOD 45 MIN: CPT | Mod: 95,,, | Performed by: PSYCHIATRY & NEUROLOGY

## 2023-03-03 PROCEDURE — 99204 PR OFFICE/OUTPT VISIT, NEW, LEVL IV, 45-59 MIN: ICD-10-PCS | Mod: 95,,, | Performed by: PSYCHIATRY & NEUROLOGY

## 2023-03-03 NOTE — PROGRESS NOTES
Subjective:       Patient ID: Thomas Victor is a 37 y.o. male.    Chief Complaint: No chief complaint on file.    HPI  The patient location is: home  The chief complaint leading to consultation is: simi/snoring    Visit type: audiovisual    Face to Face time with patient: 13 min  18 minutes of total time spent on the encounter, which includes face to face time and non-face to face time preparing to see the patient (eg, review of tests), Obtaining and/or reviewing separately obtained history, Documenting clinical information in the electronic or other health record, Independently interpreting results (not separately reported) and communicating results to the patient/family/caregiver, or Care coordination (not separately reported).     My location: [facility]    Each patient to whom he or she provides medical services by telemedicine is:  (1) informed of the relationship between the physician and patient and the respective role of any other health care provider with respect to management of the patient; and (2) notified that he or she may decline to receive medical services by telemedicine and may withdraw from such care at any time.    Snoring  +witnessed apneas  Poor quality sleep; frequent awakenings; sleep onset insomnia    PTSD from the ; not claustrophobic    Sleep latency 90    EPWORTH SLEEPINESS SCALE 3/2/2023   Sitting and reading 0   Watching TV 1   Sitting, inactive in a public place (e.g. a theatre or a meeting) 0   As a passenger in a car for an hour without a break 1   Lying down to rest in the afternoon when circumstances permit 1   Sitting and talking to someone 0   Sitting quietly after a lunch without alcohol 0   In a car, while stopped for a few minutes in traffic 0   Total score 3       Review of Systems    The remainder of the 14 system ROS is noncontributory or negative unless mentioned/reviewed above.    Objective:      Physical Exam  Mental Status: Alert and oriented x3.  Language is fluent with good comprehension.    Cranial Nerve: Ocular movements are intact. Face is symmetric at rest and with activation with intact sensation throughout. Hearing intact to finger rub bilaterally. Muscles of tongue and palate activate symmetrically. No dysarthria. Strength is full in sternocleidomastoid and trapezius bilaterally.    Motor: Strength is 5/5 in all four extremities both proximally and distally. Intact fine motor movements bilaterally.   Sensory: Sensation is intact to light touch, pinprick, vibration, and proprioception throughout. Romberg is negative.    Mall 4  Neck16    Assessment:       Problem List Items Addressed This Visit    None      Plan:       HST ordered  Introduced CBT-I

## 2023-03-08 ENCOUNTER — PROCEDURE VISIT (OUTPATIENT)
Dept: SLEEP MEDICINE | Facility: HOSPITAL | Age: 38
End: 2023-03-08
Attending: PSYCHIATRY & NEUROLOGY
Payer: OTHER GOVERNMENT

## 2023-03-08 ENCOUNTER — TELEPHONE (OUTPATIENT)
Dept: ORTHOPEDICS | Facility: CLINIC | Age: 38
End: 2023-03-08

## 2023-03-08 ENCOUNTER — OFFICE VISIT (OUTPATIENT)
Dept: ORTHOPEDICS | Facility: CLINIC | Age: 38
End: 2023-03-08
Payer: OTHER GOVERNMENT

## 2023-03-08 VITALS
HEIGHT: 70 IN | DIASTOLIC BLOOD PRESSURE: 72 MMHG | WEIGHT: 201 LBS | SYSTOLIC BLOOD PRESSURE: 105 MMHG | HEART RATE: 65 BPM | BODY MASS INDEX: 28.77 KG/M2

## 2023-03-08 DIAGNOSIS — M22.42 PATELLA, CHONDROMALACIA, LEFT: Primary | ICD-10-CM

## 2023-03-08 DIAGNOSIS — M22.41 PATELLA, CHONDROMALACIA, RIGHT: ICD-10-CM

## 2023-03-08 DIAGNOSIS — G47.33 OBSTRUCTIVE SLEEP APNEA: ICD-10-CM

## 2023-03-08 DIAGNOSIS — F51.04 PSYCHOPHYSIOLOGIC INSOMNIA: ICD-10-CM

## 2023-03-08 PROCEDURE — G0399 HOME SLEEP TEST/TYPE 3 PORTA: HCPCS | Mod: 26,,, | Performed by: PSYCHIATRY & NEUROLOGY

## 2023-03-08 PROCEDURE — 95806 SLEEP STUDY UNATT&RESP EFFT: CPT

## 2023-03-08 PROCEDURE — 99213 PR OFFICE/OUTPT VISIT, EST, LEVL III, 20-29 MIN: ICD-10-PCS | Mod: ,,, | Performed by: ORTHOPAEDIC SURGERY

## 2023-03-08 PROCEDURE — G0399 PR HOME SLEEP TEST/TYPE 3 PORTA: ICD-10-PCS | Mod: 26,,, | Performed by: PSYCHIATRY & NEUROLOGY

## 2023-03-08 PROCEDURE — 99213 OFFICE O/P EST LOW 20 MIN: CPT | Mod: ,,, | Performed by: ORTHOPAEDIC SURGERY

## 2023-03-08 NOTE — PROGRESS NOTES
Chief Complaint:   Chief Complaint   Patient presents with    Left Knee - Pain    Right Knee - Pain    Knee Pain     MRI results bilateral knees, nothing has changed since last visit       Consulting Physician: No ref. provider found    History of present illness:    he is a pleasant 37 y.o. year old male who has had increasing right knee pain over the last 3 months.  The pain is located on the medial side of the knee and also anterior.  He knows it worse with activity.  It is somewhat better with rest.  He has tried a home exercise program since October of 2022.  He had a steroid injection in December of 2022 with transient relief.  He denies any numbness or tingling.    He has noticed a longstanding history of left knee pain when he caries weight is down stairs.    Past Medical History:   Diagnosis Date    High cholesterol     Lumbar radiculopathy 9/15/2022    PTSD (post-traumatic stress disorder) 2/2/2023       Past Surgical History:   Procedure Laterality Date    VASECTOMY  6/1/19       No current outpatient medications on file.     No current facility-administered medications for this visit.       Review of patient's allergies indicates:  No Known Allergies    Family History   Problem Relation Age of Onset    Diabetes Mother     Heart disease Father        Social History     Socioeconomic History    Marital status:    Tobacco Use    Smoking status: Never    Smokeless tobacco: Never   Substance and Sexual Activity    Alcohol use: Yes     Alcohol/week: 7.0 standard drinks     Types: 7 Glasses of wine per week    Drug use: Never    Sexual activity: Yes     Partners: Female     Birth control/protection: Partner-Vasectomy       Review of Systems:    Constitution:   Denies chills, fever, and sweats.  HENT:   Denies headaches or blurry vision.  Cardiovascular:  Denies chest pain or irregular heart beat.  Respiratory:   Denies cough or shortness of breath.  Gastrointestinal:  Denies abdominal pain, nausea, or  "vomiting.  Musculoskeletal:   Denies muscle cramps.  Neurological:   Denies dizziness or focal weakness.  Psychiatric/Behavior: Normal mental status.  Hematology/Lymph:  Denies bleeding problem or easy bruising/bleeding.  Skin:    Denies rash or suspicious lesions.    Examination:    Vital Signs:    Vitals:    03/08/23 0914 03/08/23 0915   BP: 105/72    Pulse: 65    Weight: 91.2 kg (201 lb)    Height: 5' 10" (1.778 m)    PainSc:    4       Body mass index is 28.84 kg/m².    Constitution:   Well-developed, well nourished patient in no acute distress.  Neurological:   Alert and oriented x 3 and cooperative to examination.     Psychiatric/Behavior: Normal mental status.  Respiratory:   No shortness of breath.  Eyes:    Extraoccular muscles intact  Skin:    No scars, rash or suspicious lesions.    MSK:   Standing exam  stance: normal alignment, no significant leg-length discrepancy  gait:  Mildly antalgic    Knee examination  - General comments: unremarkable appearance    - Tenderness:  Right-sided medial joint line    Knee                  RIGHT    LEFT  Skin:                  Intact      Intact  ROM:                 0-130      0-130  Effusion:             +               +  MJL TTP:           +              Neg  LJL TTP:            Neg         Neg  Jose Elias:         +              Neg  Pat crep:             +             +  Patella TTPs:     Neg         Neg  Patella grind:      Neg        Neg  Lachman:           Neg        Neg  Pivot shift:          Neg        Neg  Valgus stress:    Neg        Neg  Varus stress:      Neg        Neg  Posterior drawer: Neg       Neg    N-V intact intact  Hip: nml nml    Lower extremity edema:Negative     Imaging:  Right and left knee MRIs reviewed which shows patella chondromalacia.      Assessment: Patella, chondromalacia, left    Patella, chondromalacia, right        Plan:  Think is best treatment option would be a PRP injection or Synvisc injection.  Will seek approval for these.  " Were also going to start him in formal physical therapy.

## 2023-03-08 NOTE — TELEPHONE ENCOUNTER
Dr. Vinson would like to see if his insurance would approve Donald Knee PRP. If not let me know then we will do Synvisc.

## 2023-03-17 ENCOUNTER — TELEPHONE (OUTPATIENT)
Dept: INTERNAL MEDICINE | Facility: CLINIC | Age: 38
End: 2023-03-17
Payer: OTHER GOVERNMENT

## 2023-03-20 PROBLEM — Z00.00 WELL ADULT EXAM: Status: RESOLVED | Noted: 2022-12-19 | Resolved: 2023-03-20

## 2023-03-21 ENCOUNTER — PROCEDURE VISIT (OUTPATIENT)
Dept: SLEEP MEDICINE | Facility: HOSPITAL | Age: 38
End: 2023-03-21
Attending: PSYCHIATRY & NEUROLOGY
Payer: OTHER GOVERNMENT

## 2023-03-21 DIAGNOSIS — G47.33 OBSTRUCTIVE SLEEP APNEA: ICD-10-CM

## 2023-03-21 PROCEDURE — 95810 POLYSOM 6/> YRS 4/> PARAM: CPT

## 2023-03-21 PROCEDURE — 95810 POLYSOM 6/> YRS 4/> PARAM: CPT | Mod: 26,,, | Performed by: PSYCHIATRY & NEUROLOGY

## 2023-03-21 PROCEDURE — 95810 PR POLYSOMNOGRAPHY, 4 OR MORE: ICD-10-PCS | Mod: 26,,, | Performed by: PSYCHIATRY & NEUROLOGY

## 2023-03-30 DIAGNOSIS — M17.0 PRIMARY OSTEOARTHRITIS OF BOTH KNEES: Primary | ICD-10-CM

## 2023-05-01 DIAGNOSIS — J32.8 OTHER CHRONIC SINUSITIS: Primary | ICD-10-CM

## 2023-05-02 ENCOUNTER — HOSPITAL ENCOUNTER (OUTPATIENT)
Dept: RADIOLOGY | Facility: HOSPITAL | Age: 38
Discharge: HOME | End: 2023-05-02
Attending: FAMILY MEDICINE
Payer: OTHER GOVERNMENT

## 2023-05-02 DIAGNOSIS — J32.8 OTHER CHRONIC SINUSITIS: ICD-10-CM

## 2023-05-02 PROCEDURE — 70486 CT MAXILLOFACIAL W/O DYE: CPT | Mod: TC

## 2023-07-06 ENCOUNTER — PATIENT MESSAGE (OUTPATIENT)
Dept: ORTHOPEDICS | Facility: CLINIC | Age: 38
End: 2023-07-06
Payer: OTHER GOVERNMENT

## 2023-07-19 ENCOUNTER — OFFICE VISIT (OUTPATIENT)
Dept: ORTHOPEDICS | Facility: CLINIC | Age: 38
End: 2023-07-19
Payer: OTHER GOVERNMENT

## 2023-07-19 VITALS — HEIGHT: 70 IN | BODY MASS INDEX: 28.78 KG/M2 | WEIGHT: 201.06 LBS

## 2023-07-19 DIAGNOSIS — M22.41 PATELLA, CHONDROMALACIA, RIGHT: ICD-10-CM

## 2023-07-19 DIAGNOSIS — M22.42 PATELLA, CHONDROMALACIA, LEFT: Primary | ICD-10-CM

## 2023-07-19 PROCEDURE — 20610 LARGE JOINT ASPIRATION/INJECTION: BILATERAL KNEE: ICD-10-PCS | Mod: 50,,, | Performed by: NURSE PRACTITIONER

## 2023-07-19 PROCEDURE — 20610 DRAIN/INJ JOINT/BURSA W/O US: CPT | Mod: 50,,, | Performed by: NURSE PRACTITIONER

## 2023-07-19 PROCEDURE — 99499 NO LOS: ICD-10-PCS | Mod: ,,, | Performed by: NURSE PRACTITIONER

## 2023-07-19 PROCEDURE — 99499 UNLISTED E&M SERVICE: CPT | Mod: ,,, | Performed by: NURSE PRACTITIONER

## 2023-07-19 NOTE — PROCEDURES
Large Joint Aspiration/Injection: bilateral knee    Date/Time: 7/19/2023 1:15 PM  Performed by: MONA Byrnes  Authorized by: Candelario Vinson Jr., MD     Consent Done?:  Yes (Verbal)  Indications:  Pain  Site marked: the procedure site was marked    Timeout: prior to procedure the correct patient, procedure, and site was verified    Prep: patient was prepped and draped in usual sterile fashion      Local anesthesia used?: Yes    Local anesthetic:  Lidocaine 1% without epinephrine and topical anesthetic    Details:  Needle Size:  18 G  Approach:  Anterolateral  Location:  Knee  Laterality:  Bilateral  Site:  Bilateral knee  Medications (Right):  48 mg hylan g-f 20 48 mg/6 mL  Medications (Left):  48 mg hylan g-f 20 48 mg/6 mL  Patient tolerance:  Patient tolerated the procedure well with no immediate complications

## 2023-08-24 ENCOUNTER — TELEPHONE (OUTPATIENT)
Dept: INTERNAL MEDICINE | Facility: CLINIC | Age: 38
End: 2023-08-24
Payer: OTHER GOVERNMENT

## 2023-12-12 ENCOUNTER — TELEPHONE (OUTPATIENT)
Dept: INTERNAL MEDICINE | Facility: CLINIC | Age: 38
End: 2023-12-12
Payer: OTHER GOVERNMENT

## 2023-12-12 NOTE — TELEPHONE ENCOUNTER
----- Message from Codi Broussard sent at 12/12/2023 10:52 AM CST -----  Regarding: advice  Type:  Needs Medical Advice    Who Called: pt    Best Call Back Number: [phone]  Additional Information: called about needing the progress note from visit 9/15/22 sent to him. She stated that he can receive it through the portal. Please advise

## 2023-12-13 DIAGNOSIS — M54.16 RADICULOPATHY, LUMBAR REGION: Primary | ICD-10-CM

## 2023-12-15 ENCOUNTER — TELEPHONE (OUTPATIENT)
Dept: NEUROSURGERY | Facility: CLINIC | Age: 38
End: 2023-12-15
Payer: OTHER GOVERNMENT

## 2023-12-15 NOTE — TELEPHONE ENCOUNTER
I spoke with the patient in regards to the referral we have received for Dr. Palomino. He will need to be scheduled with an PATRICA due to no recent imaging. The last MRI he has had is from 9/2022. He denies seeing any other doctors for this besides referring MD. Denies any surgeries on his neck/back and is currently getting injections w Dr. Guzmán and has tried a course of PT at [facility]. I did schedule him with Susanne for 1/2/24 at 11:00. He was compliant w date and time. I did inquire about an email address so I can obtain PT notes prior to appointment. I will email release form and he will email it back.

## 2024-01-02 ENCOUNTER — OFFICE VISIT (OUTPATIENT)
Dept: NEUROSURGERY | Facility: CLINIC | Age: 39
End: 2024-01-02
Payer: OTHER GOVERNMENT

## 2024-01-02 VITALS
BODY MASS INDEX: 29.63 KG/M2 | WEIGHT: 207 LBS | HEART RATE: 62 BPM | SYSTOLIC BLOOD PRESSURE: 121 MMHG | RESPIRATION RATE: 16 BRPM | HEIGHT: 70 IN | DIASTOLIC BLOOD PRESSURE: 82 MMHG

## 2024-01-02 DIAGNOSIS — M54.16 RADICULOPATHY, LUMBAR REGION: ICD-10-CM

## 2024-01-02 DIAGNOSIS — M54.9 DORSALGIA, UNSPECIFIED: Primary | ICD-10-CM

## 2024-01-02 PROCEDURE — 99204 OFFICE O/P NEW MOD 45 MIN: CPT | Mod: ,,, | Performed by: PHYSICIAN ASSISTANT

## 2024-01-02 RX ORDER — IBUPROFEN 200 MG
200 TABLET ORAL EVERY 6 HOURS PRN
COMMUNITY

## 2024-01-02 NOTE — PROGRESS NOTES
[facility]  History & Physical  Neurosurgery      Thomas Victor   [id number]   1985     SUBJECTIVE:     CHIEF COMPLAINT:  Lower back, right buttock and posterior leg pain      HPI:  Thomas Victor is a 38 y.o. male who presents for neurosurgical evaluation.  In July of 2022, the patient was involved with a physical fitness training for the [employer].  He was running with heavy weights.  He made a turn, and felt immediate pain in the lower back.  He then presented to the emergency department in [facility] where he was treated and released.  Within a few days, he began to have right leg pain.  Since that time, his pain has progressively worsened.  Currently, he feels he is more so at a plateau especially in the last few months.  Currently, he complains of lower back pain with pain that radiates into the right-greater-than-left buttock, right posterior thigh, posterior lower leg, and into his heel.  He describes his pain as dull and achy in nature.  He has pins and needles type sensation in the right heel.  He rates his pain at 4/10 today.  His activity is restricted secondary to his symptoms.  He was an increase in pain with any activity especially bending and climbing stairs.  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  He is experiencing difficulty climbing stairs due to the lower back and leg pain.  The pain in his right buttock especially increases when he drives a prolonged length of time.  Although he experiences mostly constant pain, restricting activity has helped to improve his symptoms.  Bending and any activity increases his symptoms.  He is no longer working out.  He is only able to walk for exercise without flaring up his lower back pain.  Chores takes longer to complete and causes an increase in pain.  He feels his lower back and core are weak.  He has undergone an extensive course of physical therapy.  He believes it has been 8 months that he was  in therapy.  He completed it maybe 2 or 3 months ago.  He has undergone 2 right L5 transforaminal epidural steroid injections with Dr. Guzmán.  He received no benefit with the 1st injection.  The 2nd injection gave him 2 days of relief of his pain.  The patient denies disturbances in bowel or bladder function.  There is no difficulty with balance.       Past Medical History:   Diagnosis Date    High cholesterol     Lumbar radiculopathy 09/15/2022    Osteoarthritis of both knees     PTSD (post-traumatic stress disorder) 02/02/2023       Past Surgical History:   Procedure Laterality Date    TENDON REPAIR Right     dorsal wrist    VASECTOMY  6/1/19       Family History   Problem Relation Age of Onset    Diabetes Mother     Heart disease Father        Social History     Socioeconomic History    Marital status:    Tobacco Use    Smoking status: Never    Smokeless tobacco: Never   Substance and Sexual Activity    Alcohol use: Yes     Alcohol/week: 7.0 standard drinks of alcohol     Types: 7 Glasses of wine per week    Drug use: Never    Sexual activity: Yes     Partners: Female     Birth control/protection: Partner-Vasectomy       Current Outpatient Medications   Medication Instructions    ibuprofen (ADVIL,MOTRIN) 200 mg, Oral, Every 6 hours PRN       Review of patient's allergies indicates:  No Known Allergies       Review of Systems   Constitutional:  Negative for chills and fever.   HENT:  Negative for nosebleeds and sore throat.    Eyes:  Negative for pain and visual disturbance.   Respiratory:  Negative for cough, chest tightness and shortness of breath.    Cardiovascular:  Negative for chest pain.   Gastrointestinal:  Negative for diarrhea, nausea and vomiting.   Genitourinary:  Negative for difficulty urinating, dysuria and hematuria.   Musculoskeletal:  Positive for back pain. Negative for gait problem and myalgias.   Skin:  Negative for rash.   Neurological:  Positive for weakness and numbness. Negative  "for dizziness, facial asymmetry and headaches.   Psychiatric/Behavioral:  Positive for sleep disturbance. Negative for confusion. The patient is not nervous/anxious.        OBJECTIVE:       Visit Vitals  /82 (BP Location: Left arm, Patient Position: Sitting)   Pulse 62   Resp 16   Ht 5' 10" (1.778 m)   Wt 93.9 kg (207 lb)   BMI 29.70 kg/m²        General:  Pleasant, Well-nourished, Well-groomed.    CV:  Neck is supple.  There are no carotid bruits.  Heart has regular rate and rhythm.    Lungs:  Lungs are clear to auscultation bilaterally with non-labored respirations.    Abdomen:  Soft, non-tender, non-distended    Musculoskeletal:   Lumbar ROM:  Full.  Lower back pain is increased with both flexion and extension.  Straight leg raise is negative bilaterally.  Crossed straight leg raise is negative bilaterally.  Brooke's test is negative bilaterally.  Hip rotation is negative bilaterally.    Neurological:  The patient is awake, alert, and oriented in all 4 spheres.  Muscle strength against resistance:    Iliopsoas Quadriceps Knee  Flexion Tibialis  anterior Gastro- cnemius EHL   Lower: R 5/5 5/5 5/5 5/5 5/5 5/5    L 5/5 5/5 5/5 5/5 5/5 5/5   Sensation is intact to primary modalities in bilateral lower extremities.  Toes are downgoing to Babinski.  There is no clonus at the ankles.  Reflexes:   Right Left   Triceps     Biceps     Brachioradialis     Patellar 2+ 2+   Achilles 2+ 2+   Gait is normal.  He is able to walk on heels and toes without difficulty.      Imaging:  CT of the cervical lumbar spine was obtained on 09/10/2022.  This study shows bilateral pars defects at L5 with 3 mm of anterolisthesis of L5 on S1.  MRI of the lumbar spine without contrast was obtained on 09/15/2022.  This study shows disc bulging with small central and right-sided extruded fragment which causes mild right foraminal stenosis.  There is anterolisthesis of L5 on S1 similar to CT with uncovering of the disc.  There is mild " bilateral foraminal stenosis, but no central stenosis.  This is to my reading.      ASSESSMENT:     1. Dorsalgia, unspecified  MRI Lumbar Spine Without Contrast    X-Ray Lumbar Complete Including Flex And Ext      2. Radiculopathy, lumbar region  Ambulatory referral/consult to Neurosurgery        PLAN:     Options were discussed at length with the patient.  He feels he has nearly failed conservative measures.  Although he is hoping to avoid surgery, he would like to entertain surgical options.  We will have him obtain updated lumbar MRI as well as lumbar x-rays with flexion-extension views.  He will return to see Dr. Palomino with that imaging.      A total of 36 minutes was spent face-to-face with the patient during this encounter.  Over half of that time was spent on counseling and coordination of care. An additional 15+ minutes were used to reviewed the patient's chart including notes from Dr. Boyd, Dr. Tanner, lumbar CT images and report, lumbar MRI images and report, and work on office note.      Susanne Wiseman PA-C      Disclaimer:  This note is prepared using voice recognition software and as such is likely to have errors despite attempts at proofreading.

## 2024-01-22 NOTE — PROGRESS NOTES
[facility]   Neurosurgery      Thomas Victor  MRN: [id number], CSN: [id number]      : 1985   Age: 38 y.o. male  Payor: [employer] / Plan: [employer] [facility] / Product Type: Government /       Ref:  No referring provider defined for this encounter.    PCP: Deon Lopez MD    Visit Date: 2024     Patient Active Problem List   Diagnosis    Dorsalgia, unspecified    Lumbar radiculopathy    Acute pain of both knees    High cholesterol    PTSD (post-traumatic stress disorder)       SUBJECTIVE:      CC:   Chief Complaint   Patient presents with    low back pain radiating into R leg       HPI:   Mr. Victor is a 38 y.o. male who has a past medical history significant for -related PTSD and osteoarthritis.  He presents as a follow up patient in the neurosurgery clinic for chronic low back pain radiating into his right posterior leg for the last 1-1/2 years since 2022.      The patient's last date of visit in the neurosurgery clinic was 2024 with MARCELLO Price.  The plan of care was to complete updated imaging studies including lumbar spine x-rays AP lateral flexion-extension views as well as a MRI lumbar spine without gadolinium.  The patient completed these radiographic studies yesterday.    In 2022, Mr. 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  After a twisting episode, he developed severe pain in his lower back with radiation down his right buttock and posterior leg.  The patient was initially evaluated in the ER in [facility].      Since this incident, he continues to have pain in his right lower back, right buttock, and posterior right leg into his heel.  The patient's sometimes has pain in his left buttock.  His discomfort is described as an aching pain that has progressively worsened with time.  Mr. Victor has tingling in the sole of his  right foot without any weakness.  There have been no reports of bowel or bladder incontinence.  The patient has been fully ambulatory.      There is increased pain with physical activity, bending, and lifting.  There has been a reduction of pain when using an inversion table.  He currently rates his pain level as 4/10.  Mr. Victor has tried taking ibuprofen and diclofenac.  The patient participated in approximately 9 months of physical therapy at [facility].  He has tried PT exercises, massage, stretching, traction, dry needling, and cupping at this facility with no significant long-term improvement.  The patient completed serial lumbar epidural steroid injections under the care of pain management specialist Dr. Boyd at [facility].  These procedures were only met with 1 day of partial relief.  Mr. Victor is currently on a profile with the [employer] that limits his physical activity due to low back pain.     In regards to his PTSD, outpatient appointments with mental health resources are in the process of being arranged.      Patient Active Problem List    Diagnosis Date Noted    PTSD (post-traumatic stress disorder) 02/02/2023    High cholesterol     Acute pain of both knees 12/19/2022    Dorsalgia, unspecified 09/15/2022    Lumbar radiculopathy 09/15/2022     Past Medical History:   Diagnosis Date    Acute medial meniscus tear of right knee, initial encounter     Acute pain of both knees     High cholesterol     Lumbar radiculopathy 09/15/2022    ARISTEO (obstructive sleep apnea)     Osteoarthritis of both knees     Patellar tendinitis of left knee     Psychophysiologic insomnia     PTSD (post-traumatic stress disorder) 02/02/2023     Past Surgical History:   Procedure Laterality Date    TENDON REPAIR Right     dorsal wrist    VASECTOMY  6/1/19       Current Outpatient Medications:     ibuprofen (ADVIL,MOTRIN) 200 MG tablet, Take 200 mg by mouth every 6 (six) hours as needed for Pain., Disp: ,  "Rfl:     Review of patient's allergies indicates:  No Known Allergies    Social History     Tobacco Use    Smoking status: Never    Smokeless tobacco: Never   Substance Use Topics    Alcohol use: Yes     Alcohol/week: 7.0 standard drinks of alcohol     Types: 7 Glasses of wine per week     Occupation: works in the oil field business as an , reservist for the [employer]    Family History   Problem Relation Age of Onset    Diabetes Mother     Heart disease Father        ROS:  Constitutional:  Negative for chills and fever.   HENT:  Negative for congestion and sore throat.    Eyes:  Negative for blurred vision and double vision.   Respiratory:  Negative for cough and shortness of breath.    Cardiovascular:  Negative for chest pain and palpitations.   Gastrointestinal:  Negative for constipation, diarrhea, nausea and vomiting.   Musculoskeletal:  Positive for back pain, Negative for neck pain.   Neurological:  Positive for sensory change, Negative for focal weakness and headaches.   Endo/Heme/Allergies:  Does not bruise/bleed easily.   Psychiatric/Behavioral:  Positive for depression and anxiety.      OBJECTIVE:     EXAMINATION:  /81   Pulse 83   Resp 16   Ht 5' 10" (1.778 m)   Wt 92.4 kg (203 lb 12.8 oz)   BMI 29.24 kg/m²   Body Habitus: Athletic build    Physical Exam:  Constitutional: The patient is well-developed and cooperative, sitting comfortably in a chair    Mental Status:   Oriented to person, place, and time  Normal speech    Motor:  Muscle bulk: normal in all extremities  Tone: normal in all extremities    Upper extremities:  Deltoid: right 5/5; left 5/5  Biceps: right 5/5; left 5/5  Triceps: right 5/5; left 5/5  : right 5/5; left 5/5  Interosseous muscles: right 5/5; left 5/5    Lower extremities:  Hip flexors: right 5/5; left 5/5  Knee extensors: right 5/5; left 5/5  Knee flexors: right 5/5; left 5/5  Foot dorsiflexors: right 5/5; left 5/5  Foot plantar flexors: right 5/5; " left 5/5  Extensor hallucis longus: right 5/5; left 5/5    Sensation:  Normal to light touch x 4 extremities    Reflexes:  Aroras sign: right negative; left negative  Babinski: right downgoing; left downgoing  Clonus: right negative; left negative    Musculoskeletal:    Gait: normal     Straight leg test: right negative; left negative    Upper back: No pain with palpation  Lower back: No pain with palpation      DIAGNOSTICS REVIEW OF IMAGING, LAB & OTHER STUDIES:  I have personally reviewed and evaluated the following reports as well as radiographic studies:    Lumbar spine x-rays, 01/23/2024- there is normal alignment bilateral L5 pars defects.  There is no motion on flexion-extension views.    CT lumbar spine without contrast, 09/10/2022- Grade I anterior spondylolisthesis of L5 on S1 measuring 3 mm, which is associated with bilateral L5 pars defects.  At L5-S1, there is left-greater-than-right bilateral neuroforaminal narrowing.     MRI lumbar spine without gadolinium, 01/23/2024- Grade I anterior spondylolisthesis of L5 on S1 measuring 2-3 mm with bilateral L5 pars defects.  There is no significant neural compression.       ASSESSMENT:  Mr. Victor is a 38 y.o. male who has a past medical history significant for -related PTSD and osteoarthritis.  He presents as a follow up patient in the neurosurgery clinic for chronic low back pain radiating into his right posterior leg for the last 1-1/2 years since July 2022.   The patient has a normal motor and sensory neurological exam.    I reviewed pertinent imaging studies with the patient.  A MRI lumbar spine without gadolinium demonstrates Grade I anterior spondylolisthesis of L5 on S1 measuring 2-3 mm with bilateral L5 pars defects.  There is no significant neural compression.         PLAN:    Encounter Diagnoses   Name Primary?    Spondylolysis of lumbosacral region Yes    Spondylolisthesis at L5-S1 level     Chronic bilateral low back pain with  right-sided sciatica      No orders of the defined types were placed in this encounter.       1.  I discussed with Mr. Victor that his low back pain with radiation down his right posterior leg is attributed to his bilateral L5 pars defects with anterior spondylolisthesis at L5-S1.  His pain follows the distribution for the right S1 nerve.  Continued optimization of medical management is recommended, but if his symptoms significantly worsen with time, he may be a future candidate for surgery, specifically a L5-S1 laminectomy and posterolateral fusion.  The risks, benefits, and alternatives this surgery are outlined below.  We discussed that a lumbar fusion surgery is not generally recommended for an active patient at his young age, as this surgery can associated with adjacent level degeneration in future years.  The patient is very agreeable to continuing with nonsurgical management.    2.  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  His physical fitness activities are currently restricted by a profile, with limits impact activities such as running.     3.  We discussed starting Neurontin for his radiating right leg pain. He would like to hold off on taking this medication at this time.    4.  He is to follow up with his established pain management specialist Dr. Guzmán.  A consideration should be made for bilateral L5 pars injections.     5.  Mr. Victor is recommended to continue with his upcoming scheduled mental health appointments for PTSD.    6.  The patient is encouraged to follow up in the neurosurgery clinic on an as-needed basis for any concerning changes in condition or symptoms.          The risks, benefits, and alternatives to surgery were discussed with the patient.    Complications of a Posterior Thoraco-Lumbar Fusion may include:  Failure to improve symptoms and/or increased or persistent pain; Recurrence, continuation, or worsening of the condition that required the  operation; Need for further surgical intervention or treatment; Neurological injury, which may include spinal cord or nerve root injury, paralysis (which involves the inability to move arms and/or legs), clumsiness, loss of sympathetic response, loss of sensation, and loss of bowel, bladder, and sexual function; Delayed or immediate spinal instability; Failure of hardware; Misplaced screw; Pedicle fracture; Failure to fuse (increased risk with nicotine use); Theoretical risk of disease transmission from allograft material; Cerebral spinal fluid leak; Meningitis; Injury to abdominal contents- great vessels, ureters, bowel, kidneys; Damage to major blood vessels, nerves, and surrounding anatomical structures; Scarring; Blindness; and Positioning problems such as neuropathy or compartment syndrome    Complications of any surgery may include:  Adverse reaction to anesthesia; Bleeding; Transfusion of blood products, which carries a risk of infection or reaction; Infection, which requires treatment with antibiotics by mouth or intravenously, or even further surgeries; Urinary tract infection; Heart attack, stroke, pneumonia, and DVT/PE (blood clot in the legs or pelvis that can dislodge and go to the lungs); Other unforeseen complications; Coma; and Death.    Benefits of surgery include:  Possible improvement in buttocks and leg pain, numbness, and/or weakness; and possible  improvement in back pain.    Alternatives to the procedure include:  Physical therapy, chiropractic care, acupuncture, medical therapy, and pain management        POSTERIOR THORACO-LUMBAR/ LUMBAR FUSION  DISCHARGE INSTRUCTIONS      1.  Wear your TLSO Brace when sitting upright and when you are out of bed.    Your brace will likely be discontinued after 3 months.      2.   Keep your incision clean and dry.    Your sutures or staples will be removed at your first postoperative follow-up appointment in approximately 14 days.   Place clean gauze and tape  over your wound daily until your sutures or staples are removed.  Wait at least 72 hours from the time of your surgery to take a shower.  After showering, pat your incision dry and replace the wound dressing.  Do not immerse your incision in water for 4-6 weeks (e.g. bath tub, hot tub, swimming pool).      3.  Activity restrictions:  No lifting greater than 15 pounds for 3 months.  No bending, stooping, or twisting.  Avoid sitting in one position for over 30 minutes at a time.  No impact exercise or contact sports for at least 3 months.  No driving for at least 2 weeks.  To resume driving short distances (<30 minutes), you must be off of your narcotic medications and be able to comfortably brake suddenly, should the need arise.    Get up and walk.      4.  Contact your Neurosurgeon if the following occurs:  Signs and symptoms of infection, including fever above 101.5 degrees Fahrenheit and/or chills.  Redness, swelling, warmth, or drainage from the incision.  Any lasting changes in sensation, numbness, and/or tingling.  Increased weakness or increased pain.  Swelling of the foot and/or lower leg with calf pain.    Neurosurgery has office hours Monday through Friday, 8:00 AM to 4:00 PM except for holidays. There is an answering service available during non-office hours, with 24 hours neurosurgery coverage.  Report to the Emergency Department if you need immediate medical assistance.      Because you have had a fusion, you are not to take steroidal medications or non-steroidal anti-inflammatory (NSAID) medications such as Motrin/ Ibuprofen or Naprosyn/ Naproxen unless agreed upon with your neurosurgeon.      Please contact Dr. Palomino's office for any questions or concerns.  Typically, your first follow-up appointment after a posterior thoraco-lumbar/ lumbar fusion surgery is approximately 14 days from the date of your operation.  At this time, sutures or staples will be removed.  For your second postoperative appointment  in 4-6 weeks, an x-ray of your lumbar spine will be arranged in Radiology before your neurosurgery appointment.        This note will be sent to the patient's referring provider No ref. provider found and primary care provider Deon Lopez MD.           Charmaine Palomino MD  Neurosurgeon

## 2024-01-23 ENCOUNTER — HOSPITAL ENCOUNTER (OUTPATIENT)
Dept: RADIOLOGY | Facility: HOSPITAL | Age: 39
Discharge: HOME | End: 2024-01-23
Attending: PHYSICIAN ASSISTANT
Payer: OTHER GOVERNMENT

## 2024-01-23 DIAGNOSIS — M54.9 DORSALGIA, UNSPECIFIED: ICD-10-CM

## 2024-01-23 PROCEDURE — 72114 X-RAY EXAM L-S SPINE BENDING: CPT | Mod: TC

## 2024-01-25 ENCOUNTER — OFFICE VISIT (OUTPATIENT)
Dept: NEUROSURGERY | Facility: CLINIC | Age: 39
End: 2024-01-25
Payer: OTHER GOVERNMENT

## 2024-01-25 VITALS
WEIGHT: 203.81 LBS | SYSTOLIC BLOOD PRESSURE: 125 MMHG | DIASTOLIC BLOOD PRESSURE: 81 MMHG | HEART RATE: 83 BPM | RESPIRATION RATE: 16 BRPM | HEIGHT: 70 IN | BODY MASS INDEX: 29.18 KG/M2

## 2024-01-25 DIAGNOSIS — M43.07 SPONDYLOLYSIS OF LUMBOSACRAL REGION: Primary | ICD-10-CM

## 2024-01-25 DIAGNOSIS — M43.17 SPONDYLOLISTHESIS AT L5-S1 LEVEL: ICD-10-CM

## 2024-01-25 DIAGNOSIS — M54.41 CHRONIC BILATERAL LOW BACK PAIN WITH RIGHT-SIDED SCIATICA: ICD-10-CM

## 2024-01-25 DIAGNOSIS — G89.29 CHRONIC BILATERAL LOW BACK PAIN WITH RIGHT-SIDED SCIATICA: ICD-10-CM

## 2024-01-25 PROCEDURE — 99214 OFFICE O/P EST MOD 30 MIN: CPT | Mod: ,,, | Performed by: NEUROLOGICAL SURGERY

## 2024-01-25 RX ORDER — PROMETHAZINE HYDROCHLORIDE AND DEXTROMETHORPHAN HYDROBROMIDE 6.25; 15 MG/5ML; MG/5ML
SYRUP ORAL
COMMUNITY
Start: 2024-01-08 | End: 2024-01-25

## 2024-01-25 RX ORDER — METHYLPREDNISOLONE 4 MG/1
TABLET ORAL
COMMUNITY
Start: 2024-01-08 | End: 2024-01-25

## 2024-01-25 RX ORDER — OSELTAMIVIR PHOSPHATE 75 MG/1
CAPSULE ORAL
COMMUNITY
Start: 2024-01-08 | End: 2024-01-25

## 2024-01-25 NOTE — PATIENT INSTRUCTIONS
Mr. Victor is a 38 y.o. male who has a past medical history significant for -related PTSD and osteoarthritis.  He presents as a follow up patient in the neurosurgery clinic for chronic low back pain radiating into his right posterior leg for the last 1-1/2 years since July 2022.   The patient has a normal motor and sensory neurological exam.    I reviewed pertinent imaging studies with the patient.  A MRI lumbar spine without gadolinium demonstrates Grade I anterior spondylolisthesis of L5 on S1 measuring 2-3 mm with bilateral L5 pars defects.  There is no significant neural compression.         PLAN:    Encounter Diagnoses   Name Primary?    Spondylolysis of lumbosacral region Yes    Spondylolisthesis at L5-S1 level     Chronic bilateral low back pain with right-sided sciatica      No orders of the defined types were placed in this encounter.       1.  I discussed with Mr. Victor that his low back pain with radiation down his right posterior leg is attributed to his bilateral L5 pars defects with anterior spondylolisthesis at L5-S1.  His pain follows the distribution for the right S1 nerve.  Continued optimization of medical management is recommended, but if his symptoms significantly worsen with time, he may be a future candidate for surgery, specifically a L5-S1 laminectomy and posterolateral fusion.  The risks, benefits, and alternatives this surgery are outlined below.  We discussed that a lumbar fusion surgery is not generally recommended for an active patient at his young age, as this surgery can associated with adjacent level degeneration in future years.  The patient is very agreeable to continuing with nonsurgical management.    2.  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  His physical fitness activities are currently restricted by a profile, with limits impact activities such as running.     3.  We discussed starting Neurontin for his radiating right leg pain. He  would like to hold off on taking this medication at this time.    4.  He is to follow up with his established pain management specialist Dr. Guzmán.  A consideration should be made for bilateral L5 pars injections.     5.  Mr. Victor is recommended to continue with his upcoming scheduled mental health appointments for PTSD.    6.  The patient is encouraged to follow up in the neurosurgery clinic on an as-needed basis for any concerning changes in condition or symptoms.          The risks, benefits, and alternatives to surgery were discussed with the patient.    Complications of a Posterior Thoraco-Lumbar Fusion may include:  Failure to improve symptoms and/or increased or persistent pain; Recurrence, continuation, or worsening of the condition that required the operation; Need for further surgical intervention or treatment; Neurological injury, which may include spinal cord or nerve root injury, paralysis (which involves the inability to move arms and/or legs), clumsiness, loss of sympathetic response, loss of sensation, and loss of bowel, bladder, and sexual function; Delayed or immediate spinal instability; Failure of hardware; Misplaced screw; Pedicle fracture; Failure to fuse (increased risk with nicotine use); Theoretical risk of disease transmission from allograft material; Cerebral spinal fluid leak; Meningitis; Injury to abdominal contents- great vessels, ureters, bowel, kidneys; Damage to major blood vessels, nerves, and surrounding anatomical structures; Scarring; Blindness; and Positioning problems such as neuropathy or compartment syndrome    Complications of any surgery may include:  Adverse reaction to anesthesia; Bleeding; Transfusion of blood products, which carries a risk of infection or reaction; Infection, which requires treatment with antibiotics by mouth or intravenously, or even further surgeries; Urinary tract infection; Heart attack, stroke, pneumonia, and DVT/PE (blood clot in the legs  or pelvis that can dislodge and go to the lungs); Other unforeseen complications; Coma; and Death.    Benefits of surgery include:  Possible improvement in buttocks and leg pain, numbness, and/or weakness; and possible  improvement in back pain.    Alternatives to the procedure include:  Physical therapy, chiropractic care, acupuncture, medical therapy, and pain management        POSTERIOR THORACO-LUMBAR/ LUMBAR FUSION  DISCHARGE INSTRUCTIONS      1.  Wear your TLSO Brace when sitting upright and when you are out of bed.    Your brace will likely be discontinued after 3 months.      2.   Keep your incision clean and dry.    Your sutures or staples will be removed at your first postoperative follow-up appointment in approximately 14 days.   Place clean gauze and tape over your wound daily until your sutures or staples are removed.  Wait at least 72 hours from the time of your surgery to take a shower.  After showering, pat your incision dry and replace the wound dressing.  Do not immerse your incision in water for 4-6 weeks (e.g. bath tub, hot tub, swimming pool).      3.  Activity restrictions:  No lifting greater than 15 pounds for 3 months.  No bending, stooping, or twisting.  Avoid sitting in one position for over 30 minutes at a time.  No impact exercise or contact sports for at least 3 months.  No driving for at least 2 weeks.  To resume driving short distances (<30 minutes), you must be off of your narcotic medications and be able to comfortably brake suddenly, should the need arise.    Get up and walk.      4.  Contact your Neurosurgeon if the following occurs:  Signs and symptoms of infection, including fever above 101.5 degrees Fahrenheit and/or chills.  Redness, swelling, warmth, or drainage from the incision.  Any lasting changes in sensation, numbness, and/or tingling.  Increased weakness or increased pain.  Swelling of the foot and/or lower leg with calf pain.    Neurosurgery has office hours Monday  through Friday, 8:00 AM to 4:00 PM except for holidays. There is an answering service available during non-office hours, with 24 hours neurosurgery coverage.  Report to the Emergency Department if you need immediate medical assistance.      Because you have had a fusion, you are not to take steroidal medications or non-steroidal anti-inflammatory (NSAID) medications such as Motrin/ Ibuprofen or Naprosyn/ Naproxen unless agreed upon with your neurosurgeon.      Please contact Dr. Palomino's office for any questions or concerns.  Typically, your first follow-up appointment after a posterior thoraco-lumbar/ lumbar fusion surgery is approximately 14 days from the date of your operation.  At this time, sutures or staples will be removed.  For your second postoperative appointment in 4-6 weeks, an x-ray of your lumbar spine will be arranged in Radiology before your neurosurgery appointment.        This note will be sent to the patient's referring provider No ref. provider found and primary care provider Deon Lopez MD.

## 2024-02-14 ENCOUNTER — TELEPHONE (OUTPATIENT)
Dept: NEUROSURGERY | Facility: CLINIC | Age: 39
End: 2024-02-14
Payer: OTHER GOVERNMENT

## 2024-02-14 DIAGNOSIS — M43.07 SPONDYLOLYSIS OF LUMBOSACRAL REGION: ICD-10-CM

## 2024-02-14 DIAGNOSIS — M54.16 RADICULOPATHY, LUMBAR REGION: Primary | ICD-10-CM

## 2024-02-14 NOTE — TELEPHONE ENCOUNTER
I faxed the order to [facility]. I called the patient and advised of this. He verbalized understanding.

## 2024-04-09 ENCOUNTER — TELEPHONE (OUTPATIENT)
Dept: SLEEP MEDICINE | Facility: HOSPITAL | Age: 39
End: 2024-04-09
Payer: OTHER GOVERNMENT

## 2024-04-09 DIAGNOSIS — R06.83 SNORING: ICD-10-CM

## 2024-04-09 DIAGNOSIS — G47.19 EXCESSIVE DAYTIME SLEEPINESS: ICD-10-CM

## 2024-04-09 DIAGNOSIS — G47.33 OBSTRUCTIVE SLEEP APNEA: Primary | ICD-10-CM

## 2024-07-08 ENCOUNTER — PROCEDURE VISIT (OUTPATIENT)
Dept: SLEEP MEDICINE | Facility: HOSPITAL | Age: 39
End: 2024-07-08
Attending: PSYCHIATRY & NEUROLOGY
Payer: OTHER GOVERNMENT

## 2024-07-08 DIAGNOSIS — G47.19 EXCESSIVE DAYTIME SLEEPINESS: ICD-10-CM

## 2024-07-08 DIAGNOSIS — R06.83 SNORING: ICD-10-CM

## 2024-07-08 DIAGNOSIS — G47.33 OBSTRUCTIVE SLEEP APNEA: ICD-10-CM

## 2024-07-08 PROCEDURE — 95810 POLYSOM 6/> YRS 4/> PARAM: CPT | Mod: 26,,, | Performed by: PSYCHIATRY & NEUROLOGY

## 2024-07-08 PROCEDURE — 95810 POLYSOM 6/> YRS 4/> PARAM: CPT

## 2024-09-06 ENCOUNTER — TELEPHONE (OUTPATIENT)
Dept: NEUROSURGERY | Facility: CLINIC | Age: 39
End: 2024-09-06
Payer: OTHER GOVERNMENT

## 2024-09-06 DIAGNOSIS — M54.16 RADICULOPATHY, LUMBAR REGION: ICD-10-CM

## 2024-09-06 DIAGNOSIS — M43.07 LUMBOSACRAL SPONDYLOLYSIS: Primary | ICD-10-CM

## 2024-09-06 NOTE — TELEPHONE ENCOUNTER
----- Message from Bharti Ramirez M.D. sent at 6/14/2017  8:31 AM PDT -----  Please inform patient we have received his lab results which show normal kidney and liver function.  His screening test for diabetes is slightly elevated, which puts him in the pre diabetes category.  He does not have diabetes currently, but is at increased risk for developing it in the future.  His bad cholesterol is very slightly elevated.  There are no new medications we need to start at this point.    Bharti Ramirez M.D.     I spoke with the patient to get him scheduled per Padma's recommendations. He is scheduled for his MRI/XR at [place] for 9/13/24 at 9:00, 9:30. He is scheduled to come back to see Dr. Palomino on 9/17/2024 at 9:00. He was compliant w date and time. I will obtain his PT notes.

## 2024-09-06 NOTE — TELEPHONE ENCOUNTER
I would recommend he come in to see Dr. Palomino to discuss additional treatment options. I have entered an order for updated MRI and x-rays of the lumbar spine for him to complete prior to this visit. Thanks

## 2024-09-16 ENCOUNTER — HOSPITAL ENCOUNTER (OUTPATIENT)
Dept: RADIOLOGY | Facility: HOSPITAL | Age: 39
Discharge: HOME | End: 2024-09-16
Attending: NURSE PRACTITIONER
Payer: OTHER GOVERNMENT

## 2024-09-16 DIAGNOSIS — M54.16 RADICULOPATHY, LUMBAR REGION: ICD-10-CM

## 2024-09-16 DIAGNOSIS — M43.07 LUMBOSACRAL SPONDYLOLYSIS: ICD-10-CM

## 2024-09-16 PROCEDURE — 72148 MRI LUMBAR SPINE W/O DYE: CPT | Mod: TC

## 2024-09-16 PROCEDURE — 72110 X-RAY EXAM L-2 SPINE 4/>VWS: CPT | Mod: TC

## 2024-09-17 ENCOUNTER — OFFICE VISIT (OUTPATIENT)
Dept: NEUROSURGERY | Facility: CLINIC | Age: 39
End: 2024-09-17
Payer: OTHER GOVERNMENT

## 2024-09-17 VITALS
HEART RATE: 69 BPM | BODY MASS INDEX: 30.52 KG/M2 | HEIGHT: 70 IN | DIASTOLIC BLOOD PRESSURE: 77 MMHG | SYSTOLIC BLOOD PRESSURE: 114 MMHG | RESPIRATION RATE: 16 BRPM | WEIGHT: 213.19 LBS

## 2024-09-17 DIAGNOSIS — M43.17 SPONDYLOLISTHESIS AT L5-S1 LEVEL: ICD-10-CM

## 2024-09-17 DIAGNOSIS — G89.29 CHRONIC BILATERAL LOW BACK PAIN WITH RIGHT-SIDED SCIATICA: ICD-10-CM

## 2024-09-17 DIAGNOSIS — M43.07 SPONDYLOLYSIS OF LUMBOSACRAL REGION: Primary | ICD-10-CM

## 2024-09-17 DIAGNOSIS — M54.41 CHRONIC BILATERAL LOW BACK PAIN WITH RIGHT-SIDED SCIATICA: ICD-10-CM

## 2024-09-17 PROCEDURE — 99214 OFFICE O/P EST MOD 30 MIN: CPT | Mod: ,,, | Performed by: NEUROLOGICAL SURGERY

## 2024-09-17 RX ORDER — OMEPRAZOLE 20 MG/1
CAPSULE, DELAYED RELEASE ORAL
COMMUNITY
Start: 2024-04-03 | End: 2024-09-17

## 2024-09-17 RX ORDER — BENZOYL PEROXIDE 100 MG/ML
LIQUID TOPICAL
COMMUNITY
Start: 2024-04-03 | End: 2024-09-17

## 2024-09-17 RX ORDER — IBUPROFEN 600 MG/1
TABLET ORAL
COMMUNITY
Start: 2024-04-03 | End: 2024-09-17

## 2024-09-17 NOTE — PATIENT INSTRUCTIONS
Mr. Victor is a 39 y.o. male who has a past medical history significant for -related PTSD and osteoarthritis.  He presents as a follow up patient in the neurosurgery clinic for chronic low back pain radiating into his primarily right greater than left posterior legs for the last 2 years since July 2022. The patient has a normal motor and sensory neurological exam.     I reviewed pertinent imaging studies with the patient.  A MRI lumbar spine without gadolinium demonstrates Grade I anterior spondylolisthesis of L5 on S1 measuring 2-3 mm with bilateral L5 pars defects.  At L4-5, there is a small disc bulge with mild right neuroforaminal narrowing.  At L5-S1, there is mild bilateral neuroforaminal narrowing.         PLAN:    Encounter Diagnoses   Name Primary?    Spondylolysis of lumbosacral region Yes    Spondylolisthesis at L5-S1 level     Chronic bilateral low back pain with right-sided sciatica      Orders Placed This Encounter   Procedures    Ambulatory referral/consult to Pain Clinic        1.  I discussed with Mr. Victor that his low back pain with radiation primarily down his right posterior leg is attributed to his bilateral L5 pars defects with minimal anterior spondylolisthesis at L5-S1.  His pain follows the distribution for the right S1 nerve.  Continued optimization of medical management is recommended, but if his symptoms significantly worsen with time, he may be a future candidate for surgery, specifically a L5-S1 laminectomy and posterolateral fusion.  The risks, benefits, and alternatives this surgery are outlined below.  We discussed that a lumbar fusion surgery is not generally recommended for an active patient at his young age, as this surgery can associated with adjacent level degeneration in future years.  The patient is very agreeable to continuing with nonsurgical management.     2.  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  His physical  fitness activities are currently restricted by a profile, with limits impact activities such as running.  His temporary profile .  The patient has been instructed to e-mail his previous profile and new profile forms to the neurosurgery clinic.  I discussed this paperwork with RAFAELA Trotter, as she will be assisting with completing this task in preparation for Mr. Victor to be medical boarded by the [employer].      3.  I am referring the patient to pain management specialist Dr. Lynn for evaluation and consideration of bilateral L5 pars injections.      4.  Mr. Victor is recommended to continue with his upcoming scheduled mental health appointments for PTSD.     5.  The patient is encouraged to follow up in the neurosurgery clinic on an as-needed basis for any concerning changes in condition or symptoms.         The risks, benefits, and alternatives to surgery were discussed with the patient.     Complications of a Posterior Thoraco-Lumbar Fusion may include:  Failure to improve symptoms and/or increased or persistent pain; Recurrence, continuation, or worsening of the condition that required the operation; Need for further surgical intervention or treatment; Neurological injury, which may include spinal cord or nerve root injury, paralysis (which involves the inability to move arms and/or legs), clumsiness, loss of sympathetic response, loss of sensation, and loss of bowel, bladder, and sexual function; Delayed or immediate spinal instability; Failure of hardware; Misplaced screw; Pedicle fracture; Failure to fuse (increased risk with nicotine use); Theoretical risk of disease transmission from allograft material; Cerebral spinal fluid leak; Meningitis; Injury to abdominal contents- great vessels, ureters, bowel, kidneys; Damage to major blood vessels, nerves, and surrounding anatomical structures; Scarring; Blindness; and Positioning problems such as neuropathy or compartment syndrome      Complications of any surgery may include:  Adverse reaction to anesthesia; Bleeding; Transfusion of blood products, which carries a risk of infection or reaction; Infection, which requires treatment with antibiotics by mouth or intravenously, or even further surgeries; Urinary tract infection; Heart attack, stroke, pneumonia, and DVT/PE (blood clot in the legs or pelvis that can dislodge and go to the lungs); Other unforeseen complications; Coma; and Death.     Benefits of surgery include:  Possible improvement in buttocks and leg pain, numbness, and/or weakness; and possible  improvement in back pain.     Alternatives to the procedure include:  Physical therapy, chiropractic care, acupuncture, medical therapy, and pain management           POSTERIOR THORACO-LUMBAR/ LUMBAR FUSION  DISCHARGE INSTRUCTIONS        1.  Wear your TLSO Brace when sitting upright and when you are out of bed.    Your brace will likely be discontinued after 3 months.       2.   Keep your incision clean and dry.    Your sutures or staples will be removed at your first postoperative follow-up appointment in approximately 14 days.   Place clean gauze and tape over your wound daily until your sutures or staples are removed.  Wait at least 72 hours from the time of your surgery to take a shower.  After showering, pat your incision dry and replace the wound dressing.  Do not immerse your incision in water for 4-6 weeks (e.g. bath tub, hot tub, swimming pool).        3.  Activity restrictions:  No lifting greater than 15 pounds for 3 months.  No bending, stooping, or twisting.  Avoid sitting in one position for over 30 minutes at a time.  No impact exercise or contact sports for at least 3 months.  No driving for at least 2 weeks.  To resume driving short distances (<30 minutes), you must be off of your narcotic medications and be able to comfortably brake suddenly, should the need arise.    Get up and walk.        4.  Contact your Neurosurgeon if  the following occurs:  Signs and symptoms of infection, including fever above 101.5 degrees Fahrenheit and/or chills.  Redness, swelling, warmth, or drainage from the incision.  Any lasting changes in sensation, numbness, and/or tingling.  Increased weakness or increased pain.  Swelling of the foot and/or lower leg with calf pain.     Neurosurgery has office hours Monday through Friday, 8:00 AM to 4:00 PM except for holidays. There is an answering service available during non-office hours, with 24 hours neurosurgery coverage.  Report to the Emergency Department if you need immediate medical assistance.       Because you have had a fusion, you are not to take steroidal medications or non-steroidal anti-inflammatory (NSAID) medications such as Motrin/ Ibuprofen or Naprosyn/ Naproxen unless agreed upon with your neurosurgeon.       Please contact Dr. Palomino's office for any questions or concerns.  Typically, your first follow-up appointment after a posterior thoraco-lumbar/ lumbar fusion surgery is approximately 14 days from the date of your operation.  At this time, sutures or staples will be removed.  For your second postoperative appointment in 4-6 weeks, an x-ray of your lumbar spine will be arranged in Radiology before your neurosurgery appointment.            This note will be sent to the patient's referring provider No ref. provider found and primary care provider Deon Lopez MD.
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
## 2024-10-09 ENCOUNTER — OFFICE VISIT (OUTPATIENT)
Facility: CLINIC | Age: 39
End: 2024-10-09
Payer: OTHER GOVERNMENT

## 2024-10-09 VITALS
DIASTOLIC BLOOD PRESSURE: 79 MMHG | HEART RATE: 66 BPM | SYSTOLIC BLOOD PRESSURE: 127 MMHG | BODY MASS INDEX: 29.35 KG/M2 | HEIGHT: 70 IN | WEIGHT: 205 LBS

## 2024-10-09 DIAGNOSIS — M43.17 SPONDYLOLISTHESIS AT L5-S1 LEVEL: ICD-10-CM

## 2024-10-09 DIAGNOSIS — G89.29 CHRONIC BILATERAL LOW BACK PAIN WITH RIGHT-SIDED SCIATICA: ICD-10-CM

## 2024-10-09 DIAGNOSIS — M43.07 SPONDYLOLYSIS OF LUMBOSACRAL REGION: Primary | ICD-10-CM

## 2024-10-09 DIAGNOSIS — M54.41 CHRONIC BILATERAL LOW BACK PAIN WITH RIGHT-SIDED SCIATICA: ICD-10-CM

## 2024-10-09 DIAGNOSIS — G89.29 CHRONIC BILATERAL LOW BACK PAIN WITH RIGHT-SIDED SCIATICA: Primary | ICD-10-CM

## 2024-10-09 DIAGNOSIS — M54.41 CHRONIC BILATERAL LOW BACK PAIN WITH RIGHT-SIDED SCIATICA: Primary | ICD-10-CM

## 2024-10-09 PROCEDURE — 99214 OFFICE O/P EST MOD 30 MIN: CPT | Mod: ,,, | Performed by: NURSE PRACTITIONER

## 2024-10-09 NOTE — PROGRESS NOTES
[facility]    Subjective:     Chief Complaint: Back Pain (spondylosis lumbosacral region, spondylolisthesis L5-S1, chronic bilateral low back pain w/ R sciatica, referral from Dr. Charmaine Palomino, This is a patient in [employer] who has low back pain radiation into primarily his right posterior leg. He has a MRI lumbar spine without gadolinium that demonstrates Grade I anterior spondylolisthesis L5 on S1 with bilateral pars defects. Please evaluate and consider bilateral L5 pars injections C/O pain level 5, Taking Ibuprofen PRN, no prior injury or sx, pt)    Referred by: Charmaine Palomino MD     History of Present Illness: Thomas Victor is a 39 y.o. male presents today as a new consult for lumbosacral spondylosis CIS, spondylolisthesis L5-S1 along with chronic bilateral low back pain with right sciatica.  Additional request was to evaluate and consider injections.  Patient has chronic low back pain radiating into his right posterior leg since July 2022.  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 In July 2022, Mr. 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  After a twisting episode, he developed severe pain in his lower back with radiation down his right buttock and posterior leg pain that has persistec.  The patient was initially evaluated in the ER in [facility].         Since this incident, he continues to have pain in his right lower back, right buttock, and posterior right leg into his heel.  The patient's sometimes has pain in his left buttock.  His discomfort is described as an aching pain that has progressively worsened with time.  Mr. Victor has tingling in the sole of his right foot without any weakness.  There have been no reports of bowel or bladder incontinence.  The patient has been fully ambulatory.  Patient no longer works in  the field due to his pain and works primarily in the office.     Pertinent findings at L5-S1 in the lumbar MRIL5-S1 demonstrates grade 1 anterolisthesis, mild posterior predominant disc space narrowing, disc desiccation, uncovering the posterior disc margin, moderate broad-based disc bulge, left foraminal to extraforaminal annular fissure, ligamentum flavum hypertrophy, and bilateral facet arthrosis. No significant central spinal canal stenosis is seen. Moderate bilateral neural foraminal narrowing is again seen.      Since this incident, he continues to have pain in his right lower back, right buttock, and posterior right leg into his heel.  The patient's sometimes has pain in his left buttock.  His discomfort is described as an aching pain that has progressively worsened with time.  Mr. Victor has tingling in the sole of his right foot without any weakness.  There have been no reports of bowel or bladder incontinence.  The patient has been fully ambulatory.  Patient no longer works in the field due to his pain and works primarily in the office.      There is increased pain with physical activity, bending, and lifting and bending to don his shoes  As well as sitting too long.  These will elevate his pain score to an 8/10 on the NRS.  There has been a reduction of pain when using an inversion table.  He currently rates his pain level as 6/10 on the NRS.  Mr. Victor has tried taking ibuprofen and diclofenac.  The patient participated in approximately 9 months of physical therapy at [facility].  He has tried PT exercises, massage, stretching, traction, dry needling, and cupping at this facility with no significant long-term improvement.  The patient completed serial lumbar epidural steroid injections under the care of pain management specialist Dr. Boyd at [facility].  Last DOTTIE was over a year ago.  These procedures were only met with 1 day of partial relief.  Mr. Victor is currently on a  "profile with the [employer] that limits his physical activity due to low back pain.      In regards to his PTSD, outpatient appointments with mental health resources are in the process of being arranged.       Pertinent PMH: PTSD, high cholesterol,  insomnia  Pertinent PSH:  No pacemaker, defibrillator, or spinal cord stimulator no spine surgeries       Vital signs:   Visit Vitals  /79 (BP Location: Right arm, Patient Position: Sitting)   Pulse 66   Ht 5' 10" (1.778 m)   Wt 93 kg (205 lb)   BMI 29.41 kg/m²      Vitals:    10/09/24 0920   PainSc:   5             Interventional Pain History   2023:Prior ESIs one day of relief.     ROS: Back and right leg pain    MRI lumbar spine 2024:   FINDINGS:  The lumbar vertebral body heights are maintained.  There is grade 1 anterolisthesis of L4 on L5, similar to the previous study.  There is trace 1-2 mm retrolisthesis of L4 on L5. No suspicious bone marrow edema suggestive of acute fracture is visualized.  The visualized abdominal aorta is nonaneurysmal.   The conus medullaris terminate at approximately the L1 level.     L1-L2 demonstrates no significant posterior disc protrusion, central spinal canal stenosis, or neural foraminal stenosis.     L2-L3 demonstrates no significant posterior disc protrusion, central spinal canal stenosis, or neural foraminal stenosis.  Minimal facet arthrosis is noted.     L3-L4 demonstrates no significant posterior disc protrusion, central spinal canal stenosis, or neural foraminal stenosis.  Minimal left greater than right facet arthrosis is noted.     L4-L5 demonstrates disc desiccation, mild-to-moderate broad-based disc bulge with superimposed right paracentral/lateral recess broad-based disc protrusion and small annular fissure, ligamentum flavum hypertrophy, and mild bilateral facet arthrosis.  There is effacement of the ventral thecal sac with minimal central spinal canal stenosis.  Moderate right greater than left lateral recess " narrowing is seen.  Mild right neural foraminal stenosis is again seen.     L5-S1 demonstrates grade 1 anterolisthesis, mild posterior predominant disc space narrowing, disc desiccation, uncovering the posterior disc margin, moderate broad-based disc bulge, left foraminal to extraforaminal annular fissure, ligamentum flavum hypertrophy, and bilateral facet arthrosis.  No significant central spinal canal stenosis is seen.  Moderate bilateral neural foraminal narrowing is again seen.     Impression:     1. Multilevel lumbar spondylosis is seen, predominantly at the L4-L5 and L5-S1 levels.  Minimal central spinal canal narrowing at L4-L5 is seen.  2. Multilevel neural foraminal narrowing with lateral recess narrowing is seen as detailed above level by level..        Electronically signed by:Aron Nina MD  Date:                                            09/16/2024  Time:                                           09:08    Objective:        Physical Exam  General: Well developed; overweight; A&O x 3; No anxiety/depression; NAD  Mental Status: Oriented to person, palce and time. Displays appropriate mood & affect.  Head: Norm cephalic and atraumatic  Neck:  No cervical paraspinal banding.  Full range of motion with lateral turning and cervical flexion +extension.  Eyes: normal conjunctiva, normal lids, normal pupils  ENT and mouth: normal external ear, nose, and no lesions noted on the lips.  Respiratory: Symmetrical, Unlabored. No dyspnea  CV: normal rhythm and rate. No peripheral edema.   Abdomen: Non-distended    Extremities:  Gen: No cyanosis or tenderness to palpation bilateral upper and lower extremities  Skin: Warm, pink, dry, no rashes, no lesions on the lumbar spine  Strength: 5/5 motor strength bilateral upper and lower extremities  ROM: Full ROM in bilateral knees and ankles without pain or instability.    Neuro:  Gait: no altalgic lean, normal toe and heel raise. Independent ambulator.  DTR's: 2+ in  bilateral patellar,   Sensory: Intact to light touch bilateral  upper and lower extremities    Spine: Normal lordosis. No scoliosis  L-spine ROM:  Limited and painful ROM to flexion, extension, bilateral rotation, when doing these activities on the right.  Full ROM to flexion, extension, bilateral rotation on the left.  Straight Leg Raise:  Positive right  SI Joint: No tenderness to palpation bilaterally.      Assessment:     Thomas Victor is a 39 y.o. male presents today as a new consult for lumbosacral spondylosis CIS, spondylolisthesis L5-S1 along with chronic bilateral low back pain with right sciatica.  Additional request was to evaluate and consider injections.  Patient has chronic low back pain radiating into his right posterior leg since July 2022.  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 In July 2022, Mr. 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  After a twisting episode, he developed severe pain in his lower back with radiation down his right buttock and posterior leg pain that has persistec.  The patient was initially evaluated in the ER in [facility].         Since this incident, he continues to have pain in his right lower back, right buttock, and posterior right leg into his heel.  The patient's sometimes has pain in his left buttock.  His discomfort is described as an aching pain that has progressively worsened with time.  Mr. Victor has tingling in the sole of his right foot without any weakness.  There have been no reports of bowel or bladder incontinence.  The patient has been fully ambulatory.  Patient no longer works in the field due to his pain and works primarily in the office.     Pertinent findings at L5-S1 in the lumbar MRIL5-S1 demonstrates grade 1 anterolisthesis, mild posterior predominant disc space narrowing, disc desiccation,  uncovering the posterior disc margin, moderate broad-based disc bulge, left foraminal to extraforaminal annular fissure, ligamentum flavum hypertrophy, and bilateral facet arthrosis. No significant central spinal canal stenosis is seen. Moderate bilateral neural foraminal narrowing is again seen.      Since this incident, he continues to have pain in his right lower back, right buttock, and posterior right leg into his heel.  The patient's sometimes has pain in his left buttock.  His discomfort is described as an aching pain that has progressively worsened with time.  Mr. Victor has tingling in the sole of his right foot without any weakness.  There have been no reports of bowel or bladder incontinence.  The patient has been fully ambulatory.  Patient no longer works in the field due to his pain and works primarily in the office.      There is increased pain with physical activity, bending, and lifting and bending to don his shoes  As well as sitting too long.  These will elevate his pain score to an 8/10 on the NRS.  There has been a reduction of pain when using an inversion table.  He currently rates his pain level as 6/10 on the NRS.  Mr. Victor has tried taking ibuprofen and diclofenac.  The patient participated in approximately 9 months of physical therapy at [facility].  He has tried PT exercises, massage, stretching, traction, dry needling, and cupping at this facility with no significant long-term improvement.  The patient completed serial lumbar epidural steroid injections under the care of pain management specialist Dr. Boyd at [facility].  Last DOTTIE was over a year ago.  These procedures were only met with 1 day of partial relief.  Mr. Victor is currently on a profile with the [employer] that limits his physical activity due to low back pain.     Plan of care:   Future consideration for bilateral L5 TFESI.  This is pending feedback we receive from  Dr. Guzmán.Request sent to  "staff to reach out to Dr. Guzmán (orthopedic spine specialist) and request where patient received his last lumbar epidural steroid injection a year ago.  [place] relayed he does not want to receive the same level of an epidural steroid injection as this was ineffective the last time he tried this.  I discuss this with Dr. Lynn who stated she will not be able to inject at pars defect; however, she would still inject in a transforaminal approach if indicated.    Encounter Diagnoses   Name Primary?    Spondylolysis of lumbosacral region Yes    Spondylolisthesis at L5-S1 level     Chronic bilateral low back pain with right-sided sciatica          Plan:       Thomas Palumbo" was seen today for back pain.    Diagnoses and all orders for this visit:    Spondylolysis of lumbosacral region  -     Ambulatory referral/consult to Pain Clinic    Spondylolisthesis at L5-S1 level  -     Ambulatory referral/consult to Pain Clinic    Chronic bilateral low back pain with right-sided sciatica  -     Ambulatory referral/consult to Pain Clinic           Past Medical History:   Diagnosis Date    Acute medial meniscus tear of right knee, initial encounter     Acute pain of both knees     High cholesterol     Lumbar radiculopathy 09/15/2022    ARISTEO (obstructive sleep apnea)     Osteoarthritis of both knees     Patellar tendinitis of left knee     Psychophysiologic insomnia     PTSD (post-traumatic stress disorder) 02/02/2023       Past Surgical History:   Procedure Laterality Date    TENDON REPAIR Right     dorsal wrist    VASECTOMY  6/1/19       Family History   Problem Relation Name Age of Onset    Diabetes Mother Cande Victor     Heart disease Father Brian Victor        Social History     Socioeconomic History    Marital status:    Tobacco Use    Smoking status: Never    Smokeless tobacco: Never   Substance and Sexual Activity    Alcohol use: Yes     Alcohol/week: 7.0 standard drinks of alcohol     Types: 7 Glasses " of wine per week    Drug use: Never    Sexual activity: Yes     Partners: Female     Birth control/protection: Partner-Vasectomy       Current Outpatient Medications   Medication Sig Dispense Refill    ibuprofen (ADVIL,MOTRIN) 200 MG tablet Take 200 mg by mouth every 6 (six) hours as needed for Pain.       No current facility-administered medications for this visit.       Review of patient's allergies indicates:  No Known Allergies

## 2025-08-27 ENCOUNTER — TELEPHONE (OUTPATIENT)
Dept: NEUROSURGERY | Facility: CLINIC | Age: 40
End: 2025-08-27